# Patient Record
Sex: MALE | Race: WHITE | NOT HISPANIC OR LATINO | Employment: OTHER | URBAN - METROPOLITAN AREA
[De-identification: names, ages, dates, MRNs, and addresses within clinical notes are randomized per-mention and may not be internally consistent; named-entity substitution may affect disease eponyms.]

---

## 2023-09-25 ENCOUNTER — HOSPITAL ENCOUNTER (OUTPATIENT)
Dept: DATA CONVERSION | Facility: HOSPITAL | Age: 63
Discharge: HOME | End: 2023-09-25

## 2023-09-25 DIAGNOSIS — M19.012 PRIMARY OSTEOARTHRITIS, LEFT SHOULDER: ICD-10-CM

## 2023-09-25 DIAGNOSIS — M75.42 IMPINGEMENT SYNDROME OF LEFT SHOULDER: ICD-10-CM

## 2023-09-25 DIAGNOSIS — M75.22 BICIPITAL TENDINITIS, LEFT SHOULDER: ICD-10-CM

## 2023-09-25 DIAGNOSIS — M75.112 INCOMPLETE ROTATOR CUFF TEAR OR RUPTURE OF LEFT SHOULDER, NOT SPECIFIED AS TRAUMATIC: ICD-10-CM

## 2023-11-24 ENCOUNTER — OFFICE VISIT (OUTPATIENT)
Dept: CARDIOLOGY | Facility: CLINIC | Age: 63
End: 2023-11-24
Payer: COMMERCIAL

## 2023-11-24 VITALS
SYSTOLIC BLOOD PRESSURE: 132 MMHG | OXYGEN SATURATION: 98 % | TEMPERATURE: 98.6 F | BODY MASS INDEX: 24.11 KG/M2 | RESPIRATION RATE: 18 BRPM | WEIGHT: 178 LBS | DIASTOLIC BLOOD PRESSURE: 73 MMHG | HEART RATE: 55 BPM | HEIGHT: 72 IN

## 2023-11-24 DIAGNOSIS — E78.2 MIXED HYPERLIPIDEMIA: ICD-10-CM

## 2023-11-24 DIAGNOSIS — I10 ESSENTIAL HYPERTENSION: Primary | ICD-10-CM

## 2023-11-24 DIAGNOSIS — I20.9 ANGINA PECTORIS (CMS-HCC): ICD-10-CM

## 2023-11-24 DIAGNOSIS — I25.118 ATHEROSCLEROTIC HEART DISEASE OF NATIVE CORONARY ARTERY WITH OTHER FORMS OF ANGINA PECTORIS (CMS-HCC): ICD-10-CM

## 2023-11-24 PROBLEM — Z97.3 WEARS GLASSES: Status: ACTIVE | Noted: 2023-11-24

## 2023-11-24 PROBLEM — H53.10 DISTURBANCE, VISUAL, SUBJECTIVE: Status: ACTIVE | Noted: 2023-11-24

## 2023-11-24 PROBLEM — D31.32 CHOROIDAL NEVUS, LEFT EYE: Status: ACTIVE | Noted: 2023-11-24

## 2023-11-24 PROBLEM — M25.541 PAIN IN JOINTS OF RIGHT HAND: Status: ACTIVE | Noted: 2018-09-18

## 2023-11-24 PROBLEM — I51.9 HEART DISEASE: Status: ACTIVE | Noted: 2023-11-24

## 2023-11-24 PROBLEM — H35.439 COBBLESTONE RETINAL DEGENERATION: Status: ACTIVE | Noted: 2023-11-24

## 2023-11-24 PROBLEM — D17.1 LIPOMA OF SKIN AND SUBCUTANEOUS TISSUE OF TRUNK: Status: ACTIVE | Noted: 2023-11-24

## 2023-11-24 PROBLEM — H25.13 NUCLEAR SCLEROSIS OF BOTH EYES: Status: ACTIVE | Noted: 2023-11-24

## 2023-11-24 PROBLEM — H01.009 BLEPHARITIS: Status: ACTIVE | Noted: 2023-11-24

## 2023-11-24 PROBLEM — H43.393 VITREOUS FLOATERS OF BOTH EYES: Status: ACTIVE | Noted: 2023-11-24

## 2023-11-24 PROBLEM — J37.0 CHRONIC LARYNGITIS: Status: ACTIVE | Noted: 2019-10-31

## 2023-11-24 PROBLEM — R04.0 ANTERIOR EPISTAXIS: Status: ACTIVE | Noted: 2023-01-03

## 2023-11-24 PROBLEM — K22.2 SCHATZKI'S RING: Status: ACTIVE | Noted: 2022-05-11

## 2023-11-24 PROBLEM — J31.0 CHRONIC RHINITIS: Status: ACTIVE | Noted: 2019-10-31

## 2023-11-24 PROBLEM — H04.123 DRY EYES: Status: ACTIVE | Noted: 2023-11-24

## 2023-11-24 PROBLEM — H52.7 REFRACTIVE ERROR: Status: ACTIVE | Noted: 2023-11-24

## 2023-11-24 PROBLEM — R53.83 FATIGUE: Status: ACTIVE | Noted: 2018-08-30

## 2023-11-24 PROBLEM — R49.8 OTHER VOICE AND RESONANCE DISORDERS: Status: ACTIVE | Noted: 2019-10-31

## 2023-11-24 PROBLEM — R06.02 SHORTNESS OF BREATH: Status: ACTIVE | Noted: 2018-08-30

## 2023-11-24 PROBLEM — R13.10 DYSPHAGIA: Status: ACTIVE | Noted: 2019-10-31

## 2023-11-24 PROBLEM — K21.9 GASTROESOPHAGEAL REFLUX DISEASE WITHOUT ESOPHAGITIS: Status: ACTIVE | Noted: 2019-10-31

## 2023-11-24 PROCEDURE — 3075F SYST BP GE 130 - 139MM HG: CPT | Performed by: INTERNAL MEDICINE

## 2023-11-24 PROCEDURE — 99213 OFFICE O/P EST LOW 20 MIN: CPT | Performed by: INTERNAL MEDICINE

## 2023-11-24 PROCEDURE — 93000 ELECTROCARDIOGRAM COMPLETE: CPT | Performed by: INTERNAL MEDICINE

## 2023-11-24 PROCEDURE — 1036F TOBACCO NON-USER: CPT | Performed by: INTERNAL MEDICINE

## 2023-11-24 PROCEDURE — 3078F DIAST BP <80 MM HG: CPT | Performed by: INTERNAL MEDICINE

## 2023-11-24 RX ORDER — METOPROLOL SUCCINATE 25 MG/1
25 TABLET, EXTENDED RELEASE ORAL DAILY
COMMUNITY
Start: 2018-08-30 | End: 2023-11-24 | Stop reason: ALTCHOICE

## 2023-11-24 RX ORDER — NAPROXEN 500 MG/1
500 TABLET ORAL
COMMUNITY
End: 2023-11-24 | Stop reason: ALTCHOICE

## 2023-11-24 RX ORDER — AMOXICILLIN 500 MG/1
500 CAPSULE ORAL EVERY 8 HOURS SCHEDULED
COMMUNITY
End: 2023-11-24 | Stop reason: ALTCHOICE

## 2023-11-24 RX ORDER — TAMSULOSIN HYDROCHLORIDE 0.4 MG/1
0.4 CAPSULE ORAL NIGHTLY
COMMUNITY
Start: 2023-06-15

## 2023-11-24 RX ORDER — PREDNISONE 10 MG/1
10 TABLET ORAL DAILY
COMMUNITY
End: 2023-11-24 | Stop reason: ALTCHOICE

## 2023-11-24 RX ORDER — SIMVASTATIN 40 MG/1
40 TABLET, FILM COATED ORAL NIGHTLY
COMMUNITY
Start: 2018-08-30 | End: 2023-11-24 | Stop reason: ALTCHOICE

## 2023-11-24 RX ORDER — METHOCARBAMOL 750 MG/1
750 TABLET, FILM COATED ORAL 3 TIMES DAILY
COMMUNITY
End: 2023-11-24 | Stop reason: ALTCHOICE

## 2023-11-24 RX ORDER — VALACYCLOVIR HYDROCHLORIDE 1 G/1
1 TABLET, FILM COATED ORAL DAILY
COMMUNITY

## 2023-11-24 RX ORDER — OMEPRAZOLE 20 MG/1
20 CAPSULE, DELAYED RELEASE ORAL
COMMUNITY
Start: 2023-10-31

## 2023-11-24 RX ORDER — MECLIZINE HCL 12.5 MG 12.5 MG/1
12.5 TABLET ORAL 3 TIMES DAILY
COMMUNITY
Start: 2023-08-17 | End: 2023-11-24 | Stop reason: ALTCHOICE

## 2023-11-24 RX ORDER — OMEPRAZOLE 20 MG/1
20 TABLET, ORALLY DISINTEGRATING, DELAYED RELEASE ORAL DAILY
COMMUNITY
End: 2023-11-24 | Stop reason: ALTCHOICE

## 2023-11-24 RX ORDER — ASPIRIN 81 MG/1
81 TABLET ORAL DAILY
COMMUNITY

## 2023-11-24 RX ORDER — GLUCOSAM/CHONDRO/HERB 149/HYAL 750-100 MG
1 TABLET ORAL DAILY
COMMUNITY

## 2023-11-24 RX ORDER — ALBUTEROL SULFATE 90 UG/1
2 AEROSOL, METERED RESPIRATORY (INHALATION) DAILY PRN
COMMUNITY
Start: 2018-12-12 | End: 2023-11-24 | Stop reason: ALTCHOICE

## 2023-11-24 RX ORDER — FLUTICASONE PROPIONATE 50 MCG
2 SPRAY, SUSPENSION (ML) NASAL 2 TIMES DAILY
COMMUNITY
Start: 2023-08-17 | End: 2023-11-24 | Stop reason: ALTCHOICE

## 2023-11-24 RX ORDER — METOPROLOL TARTRATE 25 MG/1
25 TABLET, FILM COATED ORAL DAILY
COMMUNITY
Start: 2018-04-19 | End: 2023-11-24 | Stop reason: ALTCHOICE

## 2023-11-24 RX ORDER — SILDENAFIL 100 MG/1
100 TABLET, FILM COATED ORAL AS NEEDED
COMMUNITY
Start: 2023-01-10

## 2023-11-24 RX ORDER — OXYCODONE AND ACETAMINOPHEN 5; 325 MG/1; MG/1
1 TABLET ORAL EVERY 6 HOURS PRN
COMMUNITY
End: 2023-11-24 | Stop reason: ALTCHOICE

## 2023-11-24 RX ORDER — VALACYCLOVIR HYDROCHLORIDE 500 MG/1
500 TABLET, FILM COATED ORAL DAILY
COMMUNITY
Start: 2018-08-07 | End: 2023-11-24 | Stop reason: DRUGHIGH

## 2023-11-24 RX ORDER — ACETAMINOPHEN 500 MG
5000 TABLET ORAL DAILY
COMMUNITY

## 2023-11-24 RX ORDER — IBUPROFEN 800 MG/1
800 TABLET ORAL EVERY 6 HOURS PRN
COMMUNITY
End: 2023-11-24 | Stop reason: ALTCHOICE

## 2023-11-24 RX ORDER — AMOXICILLIN 875 MG/1
875 TABLET, FILM COATED ORAL 2 TIMES DAILY
COMMUNITY
Start: 2023-08-17 | End: 2023-08-27

## 2023-11-24 RX ORDER — CEPHALEXIN 500 MG/1
500 CAPSULE ORAL 3 TIMES DAILY
COMMUNITY
End: 2023-11-24 | Stop reason: ALTCHOICE

## 2023-11-24 RX ORDER — ATORVASTATIN CALCIUM 80 MG/1
80 TABLET, FILM COATED ORAL DAILY
COMMUNITY
End: 2024-03-22 | Stop reason: SDUPTHER

## 2023-11-24 RX ORDER — OMEPRAZOLE 20 MG/1
20 CAPSULE, DELAYED RELEASE ORAL
COMMUNITY
End: 2023-11-24 | Stop reason: ALTCHOICE

## 2023-11-24 RX ORDER — HYDROCODONE BITARTRATE AND ACETAMINOPHEN 5; 325 MG/1; MG/1
1 TABLET ORAL EVERY 6 HOURS PRN
COMMUNITY
End: 2023-11-24 | Stop reason: ALTCHOICE

## 2023-11-24 RX ORDER — UBIDECARENONE 30 MG
30 CAPSULE ORAL DAILY
COMMUNITY

## 2023-11-24 RX ORDER — ALBUTEROL SULFATE 90 UG/1
2 AEROSOL, METERED RESPIRATORY (INHALATION) EVERY 6 HOURS PRN
COMMUNITY

## 2023-11-24 RX ORDER — ACETAMINOPHEN, DIPHENHYDRAMINE HCL, PHENYLEPHRINE HCL 325; 25; 5 MG/1; MG/1; MG/1
1 TABLET ORAL DAILY
COMMUNITY

## 2023-11-24 RX ORDER — METHYLPREDNISOLONE 4 MG/1
4 TABLET ORAL SEE ADMIN INSTRUCTIONS
COMMUNITY
Start: 2023-10-03 | End: 2023-11-24 | Stop reason: ALTCHOICE

## 2023-11-24 RX ORDER — AMITRIPTYLINE HYDROCHLORIDE 25 MG/1
25 TABLET, FILM COATED ORAL NIGHTLY
COMMUNITY
End: 2023-11-24 | Stop reason: ALTCHOICE

## 2023-11-24 RX ORDER — OXYCODONE HYDROCHLORIDE 5 MG/1
5 TABLET ORAL EVERY 6 HOURS PRN
COMMUNITY
End: 2023-11-24 | Stop reason: ALTCHOICE

## 2023-11-24 RX ORDER — ALFUZOSIN HYDROCHLORIDE 10 MG/1
10 TABLET, EXTENDED RELEASE ORAL DAILY
COMMUNITY
End: 2023-11-24 | Stop reason: ALTCHOICE

## 2023-11-24 RX ORDER — OMEPRAZOLE 40 MG/1
40 CAPSULE, DELAYED RELEASE ORAL
COMMUNITY
Start: 2018-08-30 | End: 2023-11-24 | Stop reason: ALTCHOICE

## 2023-11-24 ASSESSMENT — LIFESTYLE VARIABLES
HOW OFTEN DURING THE LAST YEAR HAVE YOU FOUND THAT YOU WERE NOT ABLE TO STOP DRINKING ONCE YOU HAD STARTED: NEVER
HOW OFTEN DO YOU HAVE A DRINK CONTAINING ALCOHOL: 2-3 TIMES A WEEK
HOW OFTEN DURING THE LAST YEAR HAVE YOU BEEN UNABLE TO REMEMBER WHAT HAPPENED THE NIGHT BEFORE BECAUSE YOU HAD BEEN DRINKING: NEVER
HOW OFTEN DURING THE LAST YEAR HAVE YOU HAD A FEELING OF GUILT OR REMORSE AFTER DRINKING: NEVER
AUDIT TOTAL SCORE: 3
HAVE YOU OR SOMEONE ELSE BEEN INJURED AS A RESULT OF YOUR DRINKING: NO
AUDIT-C TOTAL SCORE: 3
HOW OFTEN DURING THE LAST YEAR HAVE YOU NEEDED AN ALCOHOLIC DRINK FIRST THING IN THE MORNING TO GET YOURSELF GOING AFTER A NIGHT OF HEAVY DRINKING: NEVER
HOW OFTEN DO YOU HAVE SIX OR MORE DRINKS ON ONE OCCASION: NEVER
HAS A RELATIVE, FRIEND, DOCTOR, OR ANOTHER HEALTH PROFESSIONAL EXPRESSED CONCERN ABOUT YOUR DRINKING OR SUGGESTED YOU CUT DOWN: NO
HOW MANY STANDARD DRINKS CONTAINING ALCOHOL DO YOU HAVE ON A TYPICAL DAY: 1 OR 2
HOW OFTEN DURING THE LAST YEAR HAVE YOU FAILED TO DO WHAT WAS NORMALLY EXPECTED FROM YOU BECAUSE OF DRINKING: NEVER
SKIP TO QUESTIONS 9-10: 1

## 2023-11-24 ASSESSMENT — PATIENT HEALTH QUESTIONNAIRE - PHQ9
1. LITTLE INTEREST OR PLEASURE IN DOING THINGS: NOT AT ALL
2. FEELING DOWN, DEPRESSED OR HOPELESS: NOT AT ALL
SUM OF ALL RESPONSES TO PHQ9 QUESTIONS 1 AND 2: 0

## 2023-11-24 ASSESSMENT — PAIN SCALES - GENERAL: PAINLEVEL: 0-NO PAIN

## 2023-11-24 NOTE — PROGRESS NOTES
Subjective   Chief Complaint   Patient presents with    Follow-up     Patient presents to the office for a 6 month follow up visit.         62-year-old patient with a multiple cardiac risk factors.  History of hypertension hyperlipidemia history of asthma.  History of CAD, history of hernia surgery in the past with a shoulder surgery in past  Patient had a PCI of her LAD in January 2005.  Patient had diagnostic catheterization November 2021 essentially with small vessel disease with a patent stents in LAD.  Status post left-sided shoulder surgery.  No active angina CHF signs symptoms  Patient had a lipid profile done in July 2023 had a LDL 66, HDL 41 And triglyceride 80.  Patient stable cardiac with no active angina CHF signs symptoms.    Past Medical History:   Diagnosis Date    Angina pectoris (CMS/[facility]) 11/24/2023    Atherosclerotic heart disease of native coronary artery with other forms of angina pectoris (CMS/[facility]) 11/24/2023    Essential hypertension 11/24/2023    Heart disease 11/24/2023    Mixed hyperlipidemia 11/24/2023    Unspecified blepharitis right eye, unspecified eyelid 10/08/2019    Blepharitis of both eyes     Past Surgical History:   Procedure Laterality Date    HERNIA REPAIR      MR SHOULDER ARTHROGRAM LEFT W FL GUIDED INJECTION Left 04/03/2015    MR SHOULDER ARTHROGRAM LEFT W FL GUIDED INJECTION [facility]    ROTATOR CUFF REPAIR Bilateral      No relevant family history has been documented for this patient.  Current Outpatient Medications   Medication Sig Dispense Refill    albuterol 90 mcg/actuation inhaler Inhale 2 puffs every 6 hours if needed for shortness of breath or wheezing.      aspirin (Adult Low Dose Aspirin) 81 mg EC tablet Take 1 tablet (81 mg) by mouth once daily.      atorvastatin (Lipitor) 80 mg tablet Take 1 tablet (80 mg) by mouth once daily.      cholecalciferol (Vitamin D-3) 5,000 Units tablet Take 1 tablet (5,000 Units) by mouth once daily.      co-enzyme Q-10 30 mg  capsule Take 1 capsule (30 mg) by mouth once daily.      cyanocobalamin, vitamin B-12, (Vitamin B-12) 5,000 mcg tablet, sublingual Place 1 tablet under the tongue once daily.      omega 3-dha-epa-fish oil (Fish OiL) 1,000 mg (120 mg-180 mg) capsule Take 1 capsule (1,000 mg) by mouth once daily.      omeprazole (PriLOSEC) 20 mg DR capsule Take 1 capsule (20 mg) by mouth once daily in the morning. Take before meals.      sildenafil (Viagra) 100 mg tablet Take 1 tablet (100 mg) by mouth if needed.      tamsulosin (Flomax) 0.4 mg 24 hr capsule Take 1 capsule (0.4 mg) by mouth once daily at bedtime.      valACYclovir (Valtrex) 1 gram tablet Take 1 tablet (1,000 mg) by mouth once daily.      vitamin B complex vit C no.4 (SUPER B COMPLEX + C ORAL) Take 1 tablet by mouth once daily.      ZINC ORAL Take 20 mg by mouth once daily.       No current facility-administered medications for this visit.      reports that he has never smoked. He has never been exposed to tobacco smoke. He has never used smokeless tobacco. He reports current alcohol use. He reports that he does not use drugs.  Patient has no known allergies.  Essential hypertension    Vitals:    11/24/23 1011   BP: 132/73   Pulse: 55   Resp: 18   Temp: 37 °C (98.6 °F)   SpO2: 98%   Weight: 80.7 kg (178 lb)   Height: 1.829 m (6')   PainSc: 0-No pain      BMI:Body mass index is 24.14 kg/m².   General Cardiology:  General Appearance: Alert, oriented and in no acute distress.  HEENT: extra ocular movements intact (EOMI), pupils equal,  round, reactive to light and accommodation (PERRLA).  Carotid Upstroke: no bruit, normal.  Jugular Venous Distention (JVD): flat.  Chest: normal.  Lungs: Clear to auscultation,   Heart Sounds: no S3 or S4, normal S1, S2, regular rate.  Murmur, Click, Gallop: no systolic murmur.  Abdomen: no hepatomegaly, no masses felt, soft.  Extremities: no leg edema.  Peripheral pulses: 2 plus bilateral.  NEUROLOGY Cranial nerves II-XII grossly intact.      Patient Active Problem List   Diagnosis    Angina pectoris (CMS/HCC)    Anterior epistaxis    Coronary atherosclerosis    Blepharitis    Choroidal nevus, left eye    Chronic laryngitis    Cobblestone retinal degeneration    Disturbance, visual, subjective    Dry eyes    Dysphagia    Essential hypertension    Fatigue    GERD (gastroesophageal reflux disease)    Heart disease    Mixed hyperlipidemia    Lipoma of skin and subcutaneous tissue of trunk    Nuclear sclerosis of both eyes    Other voice and resonance disorders    Pain in joints of right hand    Refractive error    Shortness of breath    Vitreous floaters of both eyes    Wears glasses    Schatzki's ring       Problem List Items Addressed This Visit          Cardiac and Vasculature    Angina pectoris (CMS/HCC)    Relevant Medications    sildenafil (Viagra) 100 mg tablet    Coronary atherosclerosis    Relevant Medications    sildenafil (Viagra) 100 mg tablet    Essential hypertension - Primary    Relevant Orders    ECG 12 Lead    Follow Up In Cardiology    Mixed hyperlipidemia    Relevant Orders    ECG 12 Lead    Follow Up In Cardiology      As above  History of CAD, hypertension hyperlipidemia currently on a beta-blocker as well as aspirin.  Lipid profile is current and within normal range.  Modify risk factor.  Continue current treatment.  No further change in plans.  Advised patient to avoid lunch meats, canned soups, pizzas, bread rolls, and sandwiches. Advised patient to limit salt intake 1,500 mg daily. Advised patient to exercise 30 mins/3 times a week including treadmill or aerobic type, Goal to achieve 65% target HR.  Diet and exercise reviewed with patient..advice to walk about 10,000 steps or about 2 hours during day time. Cut back on salt, sugar and flour.  Advised patient to check blood pressure twice a day for next 10 days and give us a call if her blood pressure remains above 170 systolic and diastolic above 95.  Meanwhile cut back on salt,  caffeine.  If blood pressure persistently stays above 200 systolic then go to nearest emergency room or call 911.    Roland Figueroa MD

## 2024-03-22 DIAGNOSIS — E78.2 MIXED HYPERLIPIDEMIA: ICD-10-CM

## 2024-03-22 NOTE — TELEPHONE ENCOUNTER
Pt is requesting a refill       Requested Prescriptions     Pending Prescriptions Disp Refills    atorvastatin (Lipitor) 80 mg tablet 90 tablet 3     Sig: Take 1 tablet (80 mg) by mouth once daily.

## 2024-03-25 RX ORDER — ATORVASTATIN CALCIUM 80 MG/1
80 TABLET, FILM COATED ORAL DAILY
Qty: 90 TABLET | Refills: 3 | Status: SHIPPED | OUTPATIENT
Start: 2024-03-25 | End: 2025-03-20

## 2024-05-23 RX ORDER — FLUTICASONE PROPIONATE 50 MCG
2 SPRAY, SUSPENSION (ML) NASAL 2 TIMES DAILY
COMMUNITY
Start: 2024-02-07

## 2024-05-24 ENCOUNTER — OFFICE VISIT (OUTPATIENT)
Dept: CARDIOLOGY | Facility: CLINIC | Age: 64
End: 2024-05-24
Payer: COMMERCIAL

## 2024-05-24 VITALS
WEIGHT: 183 LBS | SYSTOLIC BLOOD PRESSURE: 101 MMHG | OXYGEN SATURATION: 98 % | HEART RATE: 58 BPM | RESPIRATION RATE: 18 BRPM | TEMPERATURE: 98.9 F | BODY MASS INDEX: 24.79 KG/M2 | HEIGHT: 72 IN | DIASTOLIC BLOOD PRESSURE: 57 MMHG

## 2024-05-24 DIAGNOSIS — I25.118 ATHEROSCLEROSIS OF CORONARY ARTERY OF NATIVE HEART WITH STABLE ANGINA PECTORIS, UNSPECIFIED VESSEL OR LESION TYPE (CMS-HCC): Primary | ICD-10-CM

## 2024-05-24 DIAGNOSIS — E78.2 MIXED HYPERLIPIDEMIA: ICD-10-CM

## 2024-05-24 DIAGNOSIS — I10 ESSENTIAL HYPERTENSION: ICD-10-CM

## 2024-05-24 DIAGNOSIS — K21.9 GASTROESOPHAGEAL REFLUX DISEASE WITHOUT ESOPHAGITIS: ICD-10-CM

## 2024-05-24 PROCEDURE — 99213 OFFICE O/P EST LOW 20 MIN: CPT | Performed by: INTERNAL MEDICINE

## 2024-05-24 PROCEDURE — 3074F SYST BP LT 130 MM HG: CPT | Performed by: INTERNAL MEDICINE

## 2024-05-24 PROCEDURE — 3078F DIAST BP <80 MM HG: CPT | Performed by: INTERNAL MEDICINE

## 2024-05-24 PROCEDURE — 1036F TOBACCO NON-USER: CPT | Performed by: INTERNAL MEDICINE

## 2024-05-24 ASSESSMENT — LIFESTYLE VARIABLES
HOW OFTEN DO YOU HAVE SIX OR MORE DRINKS ON ONE OCCASION: NEVER
HOW MANY STANDARD DRINKS CONTAINING ALCOHOL DO YOU HAVE ON A TYPICAL DAY: 1 OR 2
HAS A RELATIVE, FRIEND, DOCTOR, OR ANOTHER HEALTH PROFESSIONAL EXPRESSED CONCERN ABOUT YOUR DRINKING OR SUGGESTED YOU CUT DOWN: NO
HAVE YOU OR SOMEONE ELSE BEEN INJURED AS A RESULT OF YOUR DRINKING: NO
AUDIT-C TOTAL SCORE: 2
HOW OFTEN DO YOU HAVE A DRINK CONTAINING ALCOHOL: 2-4 TIMES A MONTH
SKIP TO QUESTIONS 9-10: 1
AUDIT TOTAL SCORE: 2

## 2024-05-24 ASSESSMENT — PATIENT HEALTH QUESTIONNAIRE - PHQ9
2. FEELING DOWN, DEPRESSED OR HOPELESS: NOT AT ALL
SUM OF ALL RESPONSES TO PHQ9 QUESTIONS 1 AND 2: 0
1. LITTLE INTEREST OR PLEASURE IN DOING THINGS: NOT AT ALL

## 2024-05-24 ASSESSMENT — ENCOUNTER SYMPTOMS
DEPRESSION: 0
OCCASIONAL FEELINGS OF UNSTEADINESS: 0
LOSS OF SENSATION IN FEET: 0

## 2024-05-24 ASSESSMENT — PAIN SCALES - GENERAL: PAINLEVEL: 0-NO PAIN

## 2024-05-24 NOTE — PROGRESS NOTES
Subjective   Chief Complaint   Patient presents with    Follow-up     Mr. Griffith is present for his 6 month Follow up with Dr. Figueroa         63-year-old patient with a history of hypertension hyperlipidemia currently on aspirin, Lipitor 80 mg, PPI.  No active chest pain tightness also currently taking fish oil.  Fairly active.  Patient was last seen in November last year essentially history of CAD, hernia surgery in the past with a shoulder surgery in past.  PCI of LAD done in 2005.  Patient diagnostic catheterization November 2021 essentially small vessel disease with a patent stent in LAD otherwise okay.  Patient had a TSH was within normal range comprehensive metabolic panel was done about 3 months ago essentially unremarkable.  CBC was also unremarkable.  Lipid profile was done about 4 months ago total cholesterol 115 triglyceride 59 and LDL was 61 mg deciliter.  Patient currently active.    Past Medical History:   Diagnosis Date    Angina pectoris (CMS-HCC) 11/24/2023    Atherosclerotic heart disease of native coronary artery with other forms of angina pectoris (CMS-HCC) 11/24/2023    Coronary atherosclerosis 02/27/2007    Formatting of this note might be different from the original. MI  Date: 1/05 PCI  Date: 1/05 - stent to his LAD Last Assessment & Plan: Formatting of this note might be different from the original. Assessment: States is well controlled with medication and denies any recent exacerbations Follows with PCP and cardiology History of MI with single stent placed in 2005 Treated with ASA 81 mg Denies any    Essential hypertension 11/24/2023    Heart disease 11/24/2023    Mixed hyperlipidemia 11/24/2023    Unspecified blepharitis right eye, unspecified eyelid 10/08/2019    Blepharitis of both eyes     Past Surgical History:   Procedure Laterality Date    HERNIA REPAIR      MR ATHROGRAM SHOULDER LEFT W FL GUIDED INJECTION Left 04/03/2015    MR SHOULDER ARTHROGRAM LEFT W FL GUIDED INJECTION [facility]    ROTATOR CUFF REPAIR Bilateral      No relevant family history has been documented for this patient.  Current Outpatient Medications   Medication Sig Dispense Refill    albuterol 90 mcg/actuation inhaler Inhale 2 puffs every 6 hours if needed for shortness of breath or wheezing.      aspirin (Adult Low Dose Aspirin) 81 mg EC tablet Take 1 tablet (81 mg) by mouth once daily.      atorvastatin (Lipitor) 80 mg tablet Take 1 tablet (80 mg) by mouth once daily. 90 tablet 3    cholecalciferol (Vitamin D-3) 5,000 Units tablet Take 1 tablet (5,000 Units) by mouth once daily.      co-enzyme Q-10 30 mg capsule Take 1 capsule (30 mg) by mouth once daily.      cyanocobalamin, vitamin B-12, (Vitamin B-12) 5,000 mcg tablet, sublingual Place 1 tablet under the tongue once daily.      fluticasone (Flonase) 50 mcg/actuation nasal spray Administer 2 sprays into each nostril 2 times a day.      omega 3-dha-epa-fish oil (Fish OiL) 1,000 mg (120 mg-180 mg) capsule Take 1 capsule (1,000 mg) by mouth once daily.      omeprazole (PriLOSEC) 20 mg DR capsule Take 1 capsule (20 mg) by mouth once daily in the morning. Take before meals.      sildenafil (Viagra) 100 mg tablet Take 1 tablet (100 mg) by mouth if needed.      tamsulosin (Flomax) 0.4 mg 24 hr capsule Take 1 capsule (0.4 mg) by mouth once daily at bedtime.      valACYclovir (Valtrex) 1 gram tablet Take 1 tablet (1,000 mg) by mouth once daily.      vitamin B complex vit C no.4 (SUPER B COMPLEX + C ORAL) Take 1 tablet by mouth once daily.      ZINC ORAL Take 20 mg by mouth once daily.       No current facility-administered medications for this visit.      reports that he has never smoked. He has never been exposed to tobacco smoke. He has never used smokeless tobacco. He reports current alcohol use of about 2.0 standard drinks of alcohol per week. He reports that he does not use drugs.  Patient has no known allergies.  Essential hypertension  Mixed  hyperlipidemia    Vitals:    05/24/24 1019   BP: 101/57   Pulse: 58   Resp: 18   Temp: 37.2 °C (98.9 °F)   TempSrc: Core   SpO2: 98%   Weight: 83 kg (183 lb)   Height: 1.829 m (6')   PainSc: 0-No pain      BMI:Body mass index is 24.82 kg/m².   General Cardiology:  General Appearance: Alert, oriented and in no acute distress.  HEENT: extra ocular movements intact (EOMI), pupils equal,  round, reactive to light and accommodation (PERRLA).  Carotid Upstroke: no bruit, normal.  Jugular Venous Distention (JVD): flat.  Chest: normal.  Lungs: Clear to auscultation,   Heart Sounds: no S3 or S4, normal S1, S2, regular rate.  Murmur, Click, Gallop: no systolic murmur.  Abdomen: no hepatomegaly, no masses felt, soft.  Extremities: no leg edema.  Peripheral pulses: 2 plus bilateral.  NEUROLOGY Cranial nerves II-XII grossly intact.     Patient Active Problem List   Diagnosis    Angina pectoris (CMS-HCC)    Anterior epistaxis    Coronary atherosclerosis    Blepharitis    Choroidal nevus, left eye    Chronic laryngitis    Cobblestone retinal degeneration    Disturbance, visual, subjective    Dry eyes    Dysphagia    Essential hypertension    Fatigue    GERD (gastroesophageal reflux disease)    Heart disease    Mixed hyperlipidemia    Lipoma of skin and subcutaneous tissue of trunk    Nuclear sclerosis of both eyes    Other voice and resonance disorders    Pain in joints of right hand    Refractive error    Shortness of breath    Vitreous floaters of both eyes    Wears glasses    Schatzki's ring       Problem List Items Addressed This Visit       Coronary atherosclerosis - Primary    Essential hypertension    GERD (gastroesophageal reflux disease)    Mixed hyperlipidemia      63-year-old patient with history of CAD, hypertension, hyperlipidemia currently on aspirin as well as cholesterol medicine.  Currently running low blood pressure could be contributing to her tiredness and fatigue.  1.  CAD: Continue current aspirin and statin  therapy.  Modify risk factor.  May take lipid treatment half the dose of Lipitor. Diet and exercise reviewed with patient..advice to walk about 10,000 steps or about 2 hours during day time. Cut back on salt, sugar and flour.    Pt. care time is spent includes review of diagnostic tests, labs, radiographs, EKGs, old echoes, cardiac work-up and coordination of care. Assessment, impression and plans are reflected in the note above as well as the orders.    Roland Figueroa MD

## 2024-11-13 ENCOUNTER — PREP FOR PROCEDURE (OUTPATIENT)
Dept: ORTHOPEDIC SURGERY | Facility: HOSPITAL | Age: 64
End: 2024-11-13
Payer: COMMERCIAL

## 2024-11-13 ENCOUNTER — TELEPHONE (OUTPATIENT)
Dept: CARDIOLOGY | Facility: CLINIC | Age: 64
End: 2024-11-13
Payer: COMMERCIAL

## 2024-11-13 DIAGNOSIS — M75.122 NONTRAUMATIC COMPLETE TEAR OF LEFT ROTATOR CUFF: Primary | ICD-10-CM

## 2024-11-27 PROBLEM — H69.91 EUSTACHIAN TUBE DYSFUNCTION, RIGHT: Status: ACTIVE | Noted: 2024-02-07

## 2024-11-27 PROBLEM — S00.411A ABRASION OF RIGHT EAR CANAL: Status: ACTIVE | Noted: 2024-02-07

## 2024-12-02 ENCOUNTER — APPOINTMENT (OUTPATIENT)
Dept: CARDIOLOGY | Facility: CLINIC | Age: 64
End: 2024-12-02
Payer: COMMERCIAL

## 2024-12-02 ENCOUNTER — TELEPHONE (OUTPATIENT)
Dept: CARDIOLOGY | Facility: CLINIC | Age: 64
End: 2024-12-02

## 2024-12-02 NOTE — TELEPHONE ENCOUNTER
Contacted patient to inform them that their appointment for today needed to be rescheduled due to inclement weather. I spoke with the patient directly and we successfully rescheduled the appointment to 12/5/2024 at 10:15 am.

## 2024-12-05 ENCOUNTER — OFFICE VISIT (OUTPATIENT)
Dept: CARDIOLOGY | Facility: CLINIC | Age: 64
End: 2024-12-05
Payer: COMMERCIAL

## 2024-12-05 VITALS
WEIGHT: 189 LBS | SYSTOLIC BLOOD PRESSURE: 139 MMHG | OXYGEN SATURATION: 98 % | HEIGHT: 72 IN | TEMPERATURE: 98.6 F | RESPIRATION RATE: 16 BRPM | HEART RATE: 62 BPM | DIASTOLIC BLOOD PRESSURE: 70 MMHG | BODY MASS INDEX: 25.6 KG/M2

## 2024-12-05 DIAGNOSIS — I25.118 ATHEROSCLEROSIS OF CORONARY ARTERY OF NATIVE HEART WITH STABLE ANGINA PECTORIS, UNSPECIFIED VESSEL OR LESION TYPE: Primary | ICD-10-CM

## 2024-12-05 DIAGNOSIS — I10 ESSENTIAL HYPERTENSION: ICD-10-CM

## 2024-12-05 DIAGNOSIS — E78.2 MIXED HYPERLIPIDEMIA: ICD-10-CM

## 2024-12-05 PROCEDURE — 3078F DIAST BP <80 MM HG: CPT | Performed by: INTERNAL MEDICINE

## 2024-12-05 PROCEDURE — 3075F SYST BP GE 130 - 139MM HG: CPT | Performed by: INTERNAL MEDICINE

## 2024-12-05 PROCEDURE — 99214 OFFICE O/P EST MOD 30 MIN: CPT | Performed by: INTERNAL MEDICINE

## 2024-12-05 PROCEDURE — G2211 COMPLEX E/M VISIT ADD ON: HCPCS | Performed by: INTERNAL MEDICINE

## 2024-12-05 PROCEDURE — 1036F TOBACCO NON-USER: CPT | Performed by: INTERNAL MEDICINE

## 2024-12-05 PROCEDURE — 3008F BODY MASS INDEX DOCD: CPT | Performed by: INTERNAL MEDICINE

## 2024-12-05 RX ORDER — METOPROLOL SUCCINATE 25 MG/1
25 TABLET, EXTENDED RELEASE ORAL DAILY
Qty: 90 TABLET | Refills: 3 | Status: SHIPPED | OUTPATIENT
Start: 2024-12-05 | End: 2025-12-05

## 2024-12-05 ASSESSMENT — ENCOUNTER SYMPTOMS
LOSS OF SENSATION IN FEET: 0
OCCASIONAL FEELINGS OF UNSTEADINESS: 0
DEPRESSION: 0

## 2024-12-05 ASSESSMENT — LIFESTYLE VARIABLES
AUDIT TOTAL SCORE: 1
HOW MANY STANDARD DRINKS CONTAINING ALCOHOL DO YOU HAVE ON A TYPICAL DAY: 1 OR 2
HOW OFTEN DO YOU HAVE SIX OR MORE DRINKS ON ONE OCCASION: NEVER
HOW OFTEN DO YOU HAVE A DRINK CONTAINING ALCOHOL: MONTHLY OR LESS
AUDIT-C TOTAL SCORE: 1
HAS A RELATIVE, FRIEND, DOCTOR, OR ANOTHER HEALTH PROFESSIONAL EXPRESSED CONCERN ABOUT YOUR DRINKING OR SUGGESTED YOU CUT DOWN: NO
HAVE YOU OR SOMEONE ELSE BEEN INJURED AS A RESULT OF YOUR DRINKING: NO
SKIP TO QUESTIONS 9-10: 1

## 2024-12-05 ASSESSMENT — PAIN SCALES - GENERAL: PAINLEVEL_OUTOF10: 0-NO PAIN

## 2024-12-05 ASSESSMENT — PATIENT HEALTH QUESTIONNAIRE - PHQ9
1. LITTLE INTEREST OR PLEASURE IN DOING THINGS: NOT AT ALL
SUM OF ALL RESPONSES TO PHQ9 QUESTIONS 1 AND 2: 0
2. FEELING DOWN, DEPRESSED OR HOPELESS: NOT AT ALL

## 2024-12-05 NOTE — LETTER
December 5, 2024     Christiano Osorio DO  [address] [facility] [address]    Patient: Luis Griffith   YOB: 1960   Date of Visit: 12/5/2024       Dear Dr. Christiano Osorio, DO:    Thank you for referring Luis Griffith to me for evaluation. Below are my notes for this consultation.  If you have questions, please do not hesitate to call me. I look forward to following your patient along with you.       Sincerely,     Roland Figueroa MD      CC: Saji Smith V, DO  ______________________________________________________________________________________    Subjective  Chief Complaint   Patient presents with   • Follow-up     Mr. Griffith is present for his 6 month Follow up with Dr. Figueroa, pt will also need SX clearance      63-year-old patient with a history of hypertension hyperlipidemia history of  CAD, PCI of LAD done 2005.  Repeat diagnostic catheterization about 3 years ago essentially small vessel disease with a patent stents in LAD.  Patient has upcoming left shoulder surgery.  No active chest pain tightness.  So far stable cardiac perspective.  Currently only taking statin therapy.  History of hernia surgery in the past.  Also currently on an aspirin.  Asymptomatic at the moment.  Lipid profile done about 3 months ago showed total cholesterol 112 LDL is 59.  Comprehensive metabolic panel done about 3 months ago essentially unremarkable.  LFTs unremarkable.  TSH within normal range.  CBC also was normal about a 10 months ago.  Hemoglobin A1c was last year was 5.1%.  Past Medical History:   Diagnosis Date   • Angina pectoris 11/24/2023   • Atherosclerotic heart disease of native coronary artery with other forms of angina pectoris 11/24/2023   • Coronary atherosclerosis 02/27/2007    Formatting of this note might be different from the original. MI  Date: 1/05 PCI  Date: 1/05 - stent to his LAD Last Assessment & Plan: Formatting of this note might be  different from the original. Assessment: States is well controlled with medication and denies any recent exacerbations Follows with PCP and cardiology History of MI with single stent placed in 2005 Treated with ASA 81 mg Denies any   • Essential hypertension 11/24/2023   • Heart disease 11/24/2023   • Mixed hyperlipidemia 11/24/2023   • Unspecified blepharitis right eye, unspecified eyelid 10/08/2019    Blepharitis of both eyes     Past Surgical History:   Procedure Laterality Date   • HERNIA REPAIR     • MR ATHROGRAM SHOULDER LEFT W FL GUIDED INJECTION Left 04/03/2015    MR SHOULDER ARTHROGRAM LEFT W FL GUIDED INJECTION [facility]   • ROTATOR CUFF REPAIR Bilateral      No relevant family history has been documented for this patient.  Current Outpatient Medications   Medication Sig Dispense Refill   • albuterol 90 mcg/actuation inhaler Inhale 2 puffs every 6 hours if needed for shortness of breath or wheezing.     • aspirin (Adult Low Dose Aspirin) 81 mg EC tablet Take 1 tablet (81 mg) by mouth once daily.     • atorvastatin (Lipitor) 80 mg tablet Take 1 tablet (80 mg) by mouth once daily. 90 tablet 3   • cholecalciferol (Vitamin D-3) 5,000 Units tablet Take 1 tablet (5,000 Units) by mouth once daily.     • co-enzyme Q-10 30 mg capsule Take 1 capsule (30 mg) by mouth once daily.     • cyanocobalamin, vitamin B-12, (Vitamin B-12) 5,000 mcg tablet, sublingual Place 1 tablet under the tongue once daily.     • fluticasone (Flonase) 50 mcg/actuation nasal spray Administer 2 sprays into each nostril 2 times a day.     • omega 3-dha-epa-fish oil (Fish OiL) 1,000 mg (120 mg-180 mg) capsule Take 1 capsule (1,000 mg) by mouth once daily.     • omeprazole (PriLOSEC) 20 mg DR capsule Take 1 capsule (20 mg) by mouth once daily in the morning. Take before meals.     • sildenafil (Viagra) 100 mg tablet Take 1 tablet (100 mg) by mouth if needed.     • tamsulosin (Flomax) 0.4 mg 24 hr capsule Take 1 capsule (0.4 mg) by  mouth once daily at bedtime.     • valACYclovir (Valtrex) 1 gram tablet Take 1 tablet (1,000 mg) by mouth once daily.     • vitamin B complex vit C no.4 (SUPER B COMPLEX + C ORAL) Take 1 tablet by mouth once daily.     • ZINC ORAL Take 20 mg by mouth once daily.       No current facility-administered medications for this visit.      reports that he has never smoked. He has never been exposed to tobacco smoke. He has never used smokeless tobacco. He reports current alcohol use of about 2.0 standard drinks of alcohol per week. He reports that he does not use drugs.  Allergies:  Patient has no known allergies.    ROS: See HPI  CONSTITUTIONAL: Chills- none. Fever- none. Weight change appropriate for age.  HEENT: Headache- Negative.  Change in vision- none.  Ear pain- none. Nasal congestion- none. Post-nasal drip-none.  Sore throat-none.  CARDIOLOGY: Chest pain- none.  Leg edema-trace.  Murmurs-soft systolic.  Palpitation- none.  RESPIRATORY: Denies any shortness of breath.  GI: Abdominal pain- none.  Change in bowel habits- none.  Constipation- none.  Diarrhea- none.  Nausea- none.  Vomiting- none.  MUSCULOSKELETAL: Joint pain- none.  Muscle aches- none.  DERMATOLOGY: Rash- none.  NEUROLOGY: Dizziness- none.   Headache- none.  PSYCHIATRY: Denies any depression or anxiety     Vitals:    12/05/24 1008   BP: 139/70   Pulse: 62   Resp: 16   Temp: 37 °C (98.6 °F)   TempSrc: Core   SpO2: 98%   Weight: 85.7 kg (189 lb)   Height: 1.829 m (6')   PainSc: 0-No pain      BMI:Body mass index is 25.63 kg/m².   General Cardiology:  General Appearance: Alert, oriented and in no acute distress.  HEENT: extra ocular movements intact (EOMI), pupils equal,  round, reactive to light and accommodation (PERRLA).  Carotid Upstroke: no bruit, normal.  Jugular Venous Distention (JVD): flat.  Chest: normal.  Lungs: Clear to auscultation,   Heart Sounds: no S3 or S4, normal S1, S2, regular rate.  Murmur, Click, Gallop: soft systolic  murmur.  Abdomen: no hepatomegaly, no masses felt, soft.  Extremities: no leg edema.  Peripheral pulses: 2 plus bilateral.  NEUROLOGY Cranial nerves II-XII grossly intact.     Patient Active Problem List   Diagnosis   • Angina pectoris   • Anterior epistaxis   • Coronary atherosclerosis   • Blepharitis   • Choroidal nevus, left eye   • Chronic laryngitis   • Cobblestone retinal degeneration   • Disturbance, visual, subjective   • Dry eyes   • Dysphagia   • Essential hypertension   • Fatigue   • GERD (gastroesophageal reflux disease)   • Heart disease   • Mixed hyperlipidemia   • Lipoma of skin and subcutaneous tissue of trunk   • Nuclear sclerosis of both eyes   • Other voice and resonance disorders   • Pain in joints of right hand   • Refractive error   • Shortness of breath   • Vitreous floaters of both eyes   • Wears glasses   • Schatzki's ring   • Abrasion of right ear canal   • Eustachian tube dysfunction, right       Problem List Items Addressed This Visit       Coronary atherosclerosis - Primary    Essential hypertension    Mixed hyperlipidemia      63-year-old patient with history of CAD, PCI of LAD in the past.  Patient was on blood pressure meds which has been stopped due to normalizing numbers.  Patient currently on a statin therapy.  No active chest pain tightness.    1.  Hyperlipidemia: Under control with medication.  Patient currently on Lipitor 80 mg tablet once a day.  2.  CAD: Continue current aspirin 81 mg once a day.  Patient may benefit from low-dose of beta-blocker.  3.  Preop clearance for left shoulder surgery: Patient stable cardiac wise, continue beta-blocker or rate control medication.  Moderate risk for CV events during the surgery.  No contraindication for surgery.  Hold blood thinner 48 hours before surgery.    Advised patient to avoid lunch meats, canned soups, pizzas, bread rolls, and sandwiches. Advised patient to limit salt intake 1,500 mg daily. Advised patient to exercise 30 mins/3  times a week including treadmill or aerobic type, Goal to achieve 65% target HR.    Diet and exercise reviewed with patient..advice to walk about 10,000 steps or about 2 hours during day time. Cut back on salt, sugar and flour.    Pt. care time is spent includes review of diagnostic tests, labs, radiographs, EKGs, old echoes, cardiac work-up and coordination of care. Assessment, impression and plans are reflected in the note above as well as the orders.    Roland Figueroa MD

## 2024-12-05 NOTE — PROGRESS NOTES
Subjective   Chief Complaint   Patient presents with    Follow-up     Mr. Griffith is present for his 6 month Follow up with Dr. Figueroa, pt will also need SX clearance      63-year-old patient with a history of hypertension hyperlipidemia history of  CAD, PCI of LAD done 2005.  Repeat diagnostic catheterization about 3 years ago essentially small vessel disease with a patent stents in LAD.  Patient has upcoming left shoulder surgery.  No active chest pain tightness.  So far stable cardiac perspective.  Currently only taking statin therapy.  History of hernia surgery in the past.  Also currently on an aspirin.  Asymptomatic at the moment.  Lipid profile done about 3 months ago showed total cholesterol 112 LDL is 59.  Comprehensive metabolic panel done about 3 months ago essentially unremarkable.  LFTs unremarkable.  TSH within normal range.  CBC also was normal about a 10 months ago.  Hemoglobin A1c was last year was 5.1%.  Past Medical History:   Diagnosis Date    Angina pectoris 11/24/2023    Atherosclerotic heart disease of native coronary artery with other forms of angina pectoris 11/24/2023    Coronary atherosclerosis 02/27/2007    Formatting of this note might be different from the original. MI  Date: 1/05 PCI  Date: 1/05 - stent to his LAD Last Assessment & Plan: Formatting of this note might be different from the original. Assessment: States is well controlled with medication and denies any recent exacerbations Follows with PCP and cardiology History of MI with single stent placed in 2005 Treated with ASA 81 mg Denies any    Essential hypertension 11/24/2023    Heart disease 11/24/2023    Mixed hyperlipidemia 11/24/2023    Unspecified blepharitis right eye, unspecified eyelid 10/08/2019    Blepharitis of both eyes     Past Surgical History:   Procedure Laterality Date    HERNIA REPAIR      MR ATHROGRAM SHOULDER LEFT W FL GUIDED INJECTION Left 04/03/2015    MR SHOULDER ARTHROGRAM LEFT W FL GUIDED INJECTION [facility]    ROTATOR CUFF REPAIR Bilateral      No relevant family history has been documented for this patient.  Current Outpatient Medications   Medication Sig Dispense Refill    albuterol 90 mcg/actuation inhaler Inhale 2 puffs every 6 hours if needed for shortness of breath or wheezing.      aspirin (Adult Low Dose Aspirin) 81 mg EC tablet Take 1 tablet (81 mg) by mouth once daily.      atorvastatin (Lipitor) 80 mg tablet Take 1 tablet (80 mg) by mouth once daily. 90 tablet 3    cholecalciferol (Vitamin D-3) 5,000 Units tablet Take 1 tablet (5,000 Units) by mouth once daily.      co-enzyme Q-10 30 mg capsule Take 1 capsule (30 mg) by mouth once daily.      cyanocobalamin, vitamin B-12, (Vitamin B-12) 5,000 mcg tablet, sublingual Place 1 tablet under the tongue once daily.      fluticasone (Flonase) 50 mcg/actuation nasal spray Administer 2 sprays into each nostril 2 times a day.      omega 3-dha-epa-fish oil (Fish OiL) 1,000 mg (120 mg-180 mg) capsule Take 1 capsule (1,000 mg) by mouth once daily.      omeprazole (PriLOSEC) 20 mg DR capsule Take 1 capsule (20 mg) by mouth once daily in the morning. Take before meals.      sildenafil (Viagra) 100 mg tablet Take 1 tablet (100 mg) by mouth if needed.      tamsulosin (Flomax) 0.4 mg 24 hr capsule Take 1 capsule (0.4 mg) by mouth once daily at bedtime.      valACYclovir (Valtrex) 1 gram tablet Take 1 tablet (1,000 mg) by mouth once daily.      vitamin B complex vit C no.4 (SUPER B COMPLEX + C ORAL) Take 1 tablet by mouth once daily.      ZINC ORAL Take 20 mg by mouth once daily.       No current facility-administered medications for this visit.      reports that he has never smoked. He has never been exposed to tobacco smoke. He has never used smokeless tobacco. He reports current alcohol use of about 2.0 standard drinks of alcohol per week. He reports that he does not use drugs.  Allergies:  Patient has no known allergies.    ROS: See HPI  CONSTITUTIONAL:  Chills- none. Fever- none. Weight change appropriate for age.  HEENT: Headache- Negative.  Change in vision- none.  Ear pain- none. Nasal congestion- none. Post-nasal drip-none.  Sore throat-none.  CARDIOLOGY: Chest pain- none.  Leg edema-trace.  Murmurs-soft systolic.  Palpitation- none.  RESPIRATORY: Denies any shortness of breath.  GI: Abdominal pain- none.  Change in bowel habits- none.  Constipation- none.  Diarrhea- none.  Nausea- none.  Vomiting- none.  MUSCULOSKELETAL: Joint pain- none.  Muscle aches- none.  DERMATOLOGY: Rash- none.  NEUROLOGY: Dizziness- none.   Headache- none.  PSYCHIATRY: Denies any depression or anxiety     Vitals:    12/05/24 1008   BP: 139/70   Pulse: 62   Resp: 16   Temp: 37 °C (98.6 °F)   TempSrc: Core   SpO2: 98%   Weight: 85.7 kg (189 lb)   Height: 1.829 m (6')   PainSc: 0-No pain      BMI:Body mass index is 25.63 kg/m².   General Cardiology:  General Appearance: Alert, oriented and in no acute distress.  HEENT: extra ocular movements intact (EOMI), pupils equal,  round, reactive to light and accommodation (PERRLA).  Carotid Upstroke: no bruit, normal.  Jugular Venous Distention (JVD): flat.  Chest: normal.  Lungs: Clear to auscultation,   Heart Sounds: no S3 or S4, normal S1, S2, regular rate.  Murmur, Click, Gallop: soft systolic murmur.  Abdomen: no hepatomegaly, no masses felt, soft.  Extremities: no leg edema.  Peripheral pulses: 2 plus bilateral.  NEUROLOGY Cranial nerves II-XII grossly intact.     Patient Active Problem List   Diagnosis    Angina pectoris    Anterior epistaxis    Coronary atherosclerosis    Blepharitis    Choroidal nevus, left eye    Chronic laryngitis    Cobblestone retinal degeneration    Disturbance, visual, subjective    Dry eyes    Dysphagia    Essential hypertension    Fatigue    GERD (gastroesophageal reflux disease)    Heart disease    Mixed hyperlipidemia    Lipoma of skin and subcutaneous tissue of trunk    Nuclear sclerosis of both eyes    Other  voice and resonance disorders    Pain in joints of right hand    Refractive error    Shortness of breath    Vitreous floaters of both eyes    Wears glasses    Schatzki's ring    Abrasion of right ear canal    Eustachian tube dysfunction, right       Problem List Items Addressed This Visit       Coronary atherosclerosis - Primary    Essential hypertension    Mixed hyperlipidemia      63-year-old patient with history of CAD, PCI of LAD in the past.  Patient was on blood pressure meds which has been stopped due to normalizing numbers.  Patient currently on a statin therapy.  No active chest pain tightness.    1.  Hyperlipidemia: Under control with medication.  Patient currently on Lipitor 80 mg tablet once a day.  2.  CAD: Continue current aspirin 81 mg once a day.  Patient may benefit from low-dose of beta-blocker.  3.  Preop clearance for left shoulder surgery: Patient stable cardiac wise, continue beta-blocker or rate control medication.  Moderate risk for CV events during the surgery.  No contraindication for surgery.  Hold blood thinner 48 hours before surgery.    Advised patient to avoid lunch meats, canned soups, pizzas, bread rolls, and sandwiches. Advised patient to limit salt intake 1,500 mg daily. Advised patient to exercise 30 mins/3 times a week including treadmill or aerobic type, Goal to achieve 65% target HR.    Diet and exercise reviewed with patient..advice to walk about 10,000 steps or about 2 hours during day time. Cut back on salt, sugar and flour.    Pt. care time is spent includes review of diagnostic tests, labs, radiographs, EKGs, old echoes, cardiac work-up and coordination of care. Assessment, impression and plans are reflected in the note above as well as the orders.    Roland Figueroa MD

## 2024-12-10 ENCOUNTER — TELEPHONE (OUTPATIENT)
Dept: CARDIOLOGY | Facility: CLINIC | Age: 64
End: 2024-12-10

## 2024-12-10 ENCOUNTER — PRE-ADMISSION TESTING (OUTPATIENT)
Dept: PREADMISSION TESTING | Facility: HOSPITAL | Age: 64
End: 2024-12-10
Payer: COMMERCIAL

## 2024-12-10 VITALS
OXYGEN SATURATION: 99 % | HEART RATE: 51 BPM | HEIGHT: 72 IN | SYSTOLIC BLOOD PRESSURE: 93 MMHG | RESPIRATION RATE: 16 BRPM | WEIGHT: 187 LBS | TEMPERATURE: 97 F | BODY MASS INDEX: 25.33 KG/M2 | DIASTOLIC BLOOD PRESSURE: 54 MMHG

## 2024-12-10 DIAGNOSIS — Z01.818 PREOPERATIVE TESTING: Primary | ICD-10-CM

## 2024-12-10 DIAGNOSIS — M75.122 NONTRAUMATIC COMPLETE TEAR OF LEFT ROTATOR CUFF: ICD-10-CM

## 2024-12-10 LAB
ANION GAP SERPL CALCULATED.3IONS-SCNC: 9 MMOL/L (ref 10–20)
ATRIAL RATE: 48 BPM
BUN SERPL-MCNC: 21 MG/DL (ref 6–23)
CALCIUM SERPL-MCNC: 9 MG/DL (ref 8.6–10.3)
CHLORIDE SERPL-SCNC: 105 MMOL/L (ref 98–107)
CO2 SERPL-SCNC: 28 MMOL/L (ref 21–32)
CREAT SERPL-MCNC: 0.93 MG/DL (ref 0.5–1.3)
EGFRCR SERPLBLD CKD-EPI 2021: >90 ML/MIN/1.73M*2
ERYTHROCYTE [DISTWIDTH] IN BLOOD BY AUTOMATED COUNT: 12.3 % (ref 11.5–14.5)
GLUCOSE SERPL-MCNC: 109 MG/DL (ref 74–99)
HCT VFR BLD AUTO: 40.5 % (ref 41–52)
HGB BLD-MCNC: 13.3 G/DL (ref 13.5–17.5)
MCH RBC QN AUTO: 30.9 PG (ref 26–34)
MCHC RBC AUTO-ENTMCNC: 32.8 G/DL (ref 32–36)
MCV RBC AUTO: 94 FL (ref 80–100)
NRBC BLD-RTO: 0 /100 WBCS (ref 0–0)
P AXIS: 72 DEGREES
P OFFSET: 169 MS
P ONSET: 114 MS
PLATELET # BLD AUTO: 189 X10*3/UL (ref 150–450)
POTASSIUM SERPL-SCNC: 4 MMOL/L (ref 3.5–5.3)
PR INTERVAL: 202 MS
Q ONSET: 215 MS
QRS COUNT: 8 BEATS
QRS DURATION: 104 MS
QT INTERVAL: 420 MS
QTC CALCULATION(BAZETT): 375 MS
QTC FREDERICIA: 389 MS
R AXIS: 68 DEGREES
RBC # BLD AUTO: 4.3 X10*6/UL (ref 4.5–5.9)
SODIUM SERPL-SCNC: 138 MMOL/L (ref 136–145)
T AXIS: 71 DEGREES
T OFFSET: 425 MS
VENTRICULAR RATE: 48 BPM
WBC # BLD AUTO: 4.8 X10*3/UL (ref 4.4–11.3)

## 2024-12-10 PROCEDURE — 36415 COLL VENOUS BLD VENIPUNCTURE: CPT

## 2024-12-10 PROCEDURE — 93010 ELECTROCARDIOGRAM REPORT: CPT | Performed by: INTERNAL MEDICINE

## 2024-12-10 PROCEDURE — 87081 CULTURE SCREEN ONLY: CPT | Mod: TRILAB

## 2024-12-10 PROCEDURE — 85027 COMPLETE CBC AUTOMATED: CPT

## 2024-12-10 PROCEDURE — 80048 BASIC METABOLIC PNL TOTAL CA: CPT

## 2024-12-10 PROCEDURE — 93005 ELECTROCARDIOGRAM TRACING: CPT

## 2024-12-10 RX ORDER — PETROLATUM,WHITE/LANOLIN
1 OINTMENT (GRAM) TOPICAL DAILY
COMMUNITY

## 2024-12-10 RX ORDER — CHLORHEXIDINE GLUCONATE ORAL RINSE 1.2 MG/ML
SOLUTION DENTAL
Qty: 473 ML | Refills: 0 | Status: SHIPPED | OUTPATIENT
Start: 2024-12-10

## 2024-12-10 ASSESSMENT — DUKE ACTIVITY SCORE INDEX (DASI)
CAN YOU PARTICIPATE IN STRENOUS SPORTS LIKE SWIMMING, SINGLES TENNIS, FOOTBALL, BASKETBALL, OR SKIING: YES
CAN YOU DO HEAVY WORK AROUND THE HOUSE LIKE SCRUBBING FLOORS OR LIFTING AND MOVING HEAVY FURNITURE: YES
CAN YOU DO MODERATE WORK AROUND THE HOUSE LIKE VACUUMING, SWEEPING FLOORS OR CARRYING GROCERIES: YES
CAN YOU PARTICIPATE IN MODERATE RECREATIONAL ACTIVITIES LIKE GOLF, BOWLING, DANCING, DOUBLES TENNIS OR THROWING A BASEBALL OR FOOTBALL: YES
CAN YOU HAVE SEXUAL RELATIONS: YES
CAN YOU DO LIGHT WORK AROUND THE HOUSE LIKE DUSTING OR WASHING DISHES: YES
CAN YOU TAKE CARE OF YOURSELF (EAT, DRESS, BATHE, OR USE TOILET): YES
CAN YOU CLIMB A FLIGHT OF STAIRS OR WALK UP A HILL: YES
CAN YOU DO YARD WORK LIKE RAKING LEAVES, WEEDING OR PUSHING A MOWER: YES
DASI METS SCORE: 9.9
CAN YOU RUN A SHORT DISTANCE: YES
TOTAL_SCORE: 58.2
CAN YOU WALK INDOORS, SUCH AS AROUND YOUR HOUSE: YES
CAN YOU WALK A BLOCK OR TWO ON LEVEL GROUND: YES

## 2024-12-10 ASSESSMENT — ENCOUNTER SYMPTOMS
EYES NEGATIVE: 1
CARDIOVASCULAR NEGATIVE: 1
ACTIVITY CHANGE: 1
RESPIRATORY NEGATIVE: 1
NEUROLOGICAL NEGATIVE: 1
GASTROINTESTINAL NEGATIVE: 1
PSYCHIATRIC NEGATIVE: 1
ARTHRALGIAS: 1

## 2024-12-10 NOTE — PREPROCEDURE INSTRUCTIONS
Why must I stop eating and drinking near surgery time?  With sedation, food or liquid in your stomach can enter your lungs causing serious complications  Increases nausea and vomiting    When do I need to stop eating and drinking before my surgery?   Do not eat or drink after midnight the night before your surgery/procedure.  You may have small sips of water to take your medication.      PAT DISCHARGE INSTRUCTIONS    Please call the Same Day Surgery (SDS) Department of the hospital where your procedure will be performed after 2:00 PM the day before your surgery. If you are scheduled on a Monday, or a Tuesday following a Monday holiday, you will need to call on the last business day prior to your surgery.      [facility]  [id number]  [place] [id number]  [phone]    Please let your surgeon know if:      You develop any open sores, shingles, burning or painful urination as these may increase your risk of an infection.   You no longer wish to have the surgery.   Any other personal circumstances change that may lead to the need to cancel or defer this surgery-such as being sick or getting admitted to any hospital within one week of your planned procedure.    Your contact details change, such as a change of address or phone number.    Starting now:     Please DO NOT drink alcohol or smoke for 24 hours before surgery. It is well known that quitting smoking can make a huge difference to your health and recovery from surgery. The longer you abstain from smoking, the better your chances of a healthy recovery. If you need help with quitting, call 2-584-QUIT-NOW to be connected to a trained counselor who will discuss the best methods to help you quit.     Before your surgery:    Please stop all supplements 7 days prior to surgery. Or as directed by your surgeon.   Please stop taking NSAID pain medicine such as Advil and Motrin 7 days before surgery.    If you develop any  fever, cough, cold, rashes, cuts, scratches, scrapes, urinary symptoms or infection anywhere on your body (including teeth and gums) prior to surgery, please call your surgeon’s office as soon as possible. This may require treatment to reduce the chance of cancellation on the day of surgery.    The day before your surgery:   DIET- Please follow the diet instructions at the top of your packet.   Get a good night’s rest.  Use the special soap for bathing if you have been instructed to use one.    Scheduled surgery times may change and you will be notified if this occurs - please check your personal voicemail for any updates.     On the morning of surgery:   Wear comfortable, loose fitting clothes which open in the front. Please do not wear moisturizers, creams, lotions, makeup or perfume.    Please bring with you to surgery:   Photo ID and insurance card   Current list of medicines and allergies   Pacemaker/ Defibrillator/Heart stent cards   CPAP machine and mask    Slings/ splints/ crutches   A copy of your complete advanced directive/DHPOA.    Please do NOT bring with you to surgery:   All jewelry and valuables should be left at home.   Prosthetic devices such as contact lenses, hearing aids, dentures, eyelash extensions, hairpins and body piercings must be removed prior to going in to the surgical suite.    After outpatient surgery:   A responsible adult MUST accompany you at the time of discharge and stay with you for 24 hours after your surgery. You may NOT drive yourself home after surgery.    Do not drive, operate machinery, make critical decisions or do activities that require co-ordination or balance until after a night’s sleep.   Do not drink alcoholic beverages for 24 hours.   Instructions for resuming your medications will be provided by your surgeon.    CALL YOUR DOCTOR AFTER SURGERY IF YOU HAVE:     Chills and/or a fever of 101° F or higher.    Redness, swelling, pus or drainage from your surgical wound  or a bad smell from the wound.    Lightheadedness, fainting or confusion.    Persistent vomiting (throwing up) and are not able to eat or drink for 12 hours.    Three or more loose, watery bowel movements in 24 hours (diarrhea).   Difficulty or pain while urinating( after non-urological surgery)    Pain and swelling in your legs, especially if it is only on one side.    Difficulty breathing or are breathing faster than normal.    Any new concerning symptoms.      Patient Information: Pre-Operative Infection Prevention Measures     Why did I have my nose, under my arms, and groin swabbed?  The purpose of the swab is to identify Staphylococcus aureus inside your nose or on your skin.  The swab was sent to the laboratory for culture.  A positive swab/culture for Staphylococcus aureus is called colonization or carriage.      What is Staphylococcus aureus?  Staphylococcus aureus, also known as “staph”, is a germ found on the skin or in the nose of healthy people.  Sometimes Staphylococcus aureus can get into the body and cause an infection.  This can be minor (such as pimples, boils, or other skin problems).  It might also be serious (such as a blood infection, pneumonia, or a surgical site infection).    What is Staphylococcus aureus colonization or carriage?  Colonization or carriage means that a person has the germ but is not sick from it.  These bacteria can be spread on the hands or when breathing or sneezing.    How is Staphylococcus aureus spread?  It is most often spread by close contact with a person or item that carries it.    What happens if my culture is positive for Staphylococcus aureus?  Your doctor/medical team will use this information to guide any antibiotic treatment which may be necessary.  Regardless of the culture results, we will clean the inside of your nose with a betadine swab just before you have your surgery.      Will I get an infection if I have Staphylococcus aureus in my nose or on my  skin?  Anyone can get an infection with Staphylococcus aureus.  However, the best way to reduce your risk of infection is to follow the instructions provided to you for the use of your CHG soap and dental rinse.        Patient Information: Oral/Dental Rinse    What is oral/dental rinse?   It is a mouthwash. It is a way of cleaning the mouth with a germ-killing solution before your surgery.  The solution contains chlorhexidine, commonly known as CHG.   It is used inside the mouth to kill a bacteria known as Staphylococcus aureus.  Let your doctor know if you are allergic to Chlorhexidine.    Why do I need to use CHG oral/dental rinse?  The CHG oral/dental rinse helps to kill a bacteria in your mouth known as Staphylococcus aureus.     This reduces the risk of infection at the surgical site.      Using your CHG oral/dental rinse  STEPS:  Use your CHG oral/dental rinse after you brush your teeth the night before (at bedtime) and the morning of your surgery.  Follow all directions on your prescription label.    Use the cap on the container to measure 15ml   Swish (gargle if you can) the mouthwash in your mouth for at least 30 seconds, (do not swallow) and spit out  After you use your CHG rinse, do not rinse your mouth with water, drink or eat.  Please refer to the prescription label for the appropriate time to resume oral intake      What side effects might I have using the CHG oral/dental rinse?  CHG rinse will stick to plaque on the teeth.  Brush and floss just before use.  Teeth brushing will help avoid staining of plaque during use.      Patient Information: Home Preoperative Antibacterial Shower      What is a home preoperative antibacterial shower?  This shower is a way of cleaning the skin with a germ-killing solution before surgery.  The solution contains chlorhexidine, commonly known as CHG.  CHG is a skin cleanser with germ-killing ability.  Let your doctor know if you are allergic to chlorhexidine.    Why do  I need to take a preoperative antibacterial shower?  Skin is not sterile.  It is best to try to make your skin as free of germs as possible before surgery.  Proper cleansing with a germ-killing soap before surgery can lower the number of germs on your skin.  This helps to reduce the risk of infection at the surgical site.  Following the instructions listed below will help you prepare your skin for surgery.      How do I use the solution?  Steps:  Begin using your CHG soap 5 days before your scheduled surgery on ________________________.    First, wash and rinse your hair using the CHG soap. Keep CHG soap away from ear canals and eyes.  Rinse completely, do not condition.  Hair extensions should be removed.  Wash your face with your normal soap and rinse.    Apply the CHG solution to a clean wet washcloth.  Turn the water off or move away from the water spray to avoid premature rinsing of the CHG soap as you are applying.   Firmly lather your entire body from the neck down.  Do not use on your face.  Pay special attention to the area(s) where your incision(s) will be located unless they are on your face.  Avoid scrubbing your skin too hard.  The important point is to have the CHG soap sit on your skin for 3 minutes.    When the 3 minutes are up, turn on the water and rinse the CHG solution off your body completely.   DO NOT wash with regular soap after you have used the CHG soap solution  Pat yourself dry with a clean, freshly-laundered towel.  DO NOT apply powders, deodorants, or lotions.  Dress in clean, freshly laundered nightclothes.    Be sure to sleep with clean, freshly laundered sheets.  Be aware that CHG will cause stains on fabrics; if you wash them with bleach after use.  Rinse your washcloth and other linens that have contact with CHG completely.  Use only non-chlorine detergents to launder the items used.   The morning of surgery is the fifth day.  Repeat the above steps and dress in clean comfortable  clothing     Whom should I contact if I have any questions regarding the use of CHG soap?  Call the [facility] at [phone] if you have any questions.                  Medication List            Accurate as of December 10, 2024  9:26 AM. Always use your most recent med list.                Adult Low Dose Aspirin 81 mg EC tablet  Generic drug: aspirin  Additional Medication Adjustments for Surgery: Take last dose 2 days before surgery  Notes to patient: PER DR PABLO CLEARANCE INSTRUCTIONS     albuterol 90 mcg/actuation inhaler  Medication Adjustments for Surgery: Take/Use as prescribed     atorvastatin 80 mg tablet  Commonly known as: Lipitor  Take 1 tablet (80 mg) by mouth once daily.  Notes to patient: Take evening dose before surgery.     cholecalciferol 5,000 Units tablet  Commonly known as: Vitamin D-3  Additional Medication Adjustments for Surgery: Take last dose 7 days before surgery     co-enzyme Q-10 30 mg capsule  Additional Medication Adjustments for Surgery: Take last dose 7 days before surgery     Fish OiL 1,000 (120-180) mg capsule  Generic drug: omega 3-dha-epa-fish oil  Additional Medication Adjustments for Surgery: Take last dose 7 days before surgery     fluticasone 50 mcg/actuation nasal spray  Commonly known as: Flonase  Medication Adjustments for Surgery: Take/Use as prescribed     glucosamine sulfate 500 mg capsule  Additional Medication Adjustments for Surgery: Take last dose 7 days before surgery     metoprolol succinate XL 25 mg 24 hr tablet  Commonly known as: Toprol-XL  Take 1 tablet (25 mg) by mouth once daily. Do not crush or chew.  Medication Adjustments for Surgery: Take on the morning of surgery     omeprazole 20 mg DR capsule  Commonly known as: PriLOSEC  Medication Adjustments for Surgery: Take on the morning of surgery     sildenafil 100 mg tablet  Commonly known as: Viagra  Medication Adjustments for Surgery:  Take last dose 3 days before surgery     SUPER B COMPLEX + C ORAL  Additional Medication Adjustments for Surgery: Take last dose 7 days before surgery     tamsulosin 0.4 mg 24 hr capsule  Commonly known as: Flomax  Medication Adjustments for Surgery: Take on the morning of surgery     TURMERIC ORAL  Additional Medication Adjustments for Surgery: Take last dose 7 days before surgery     valACYclovir 1 gram tablet  Commonly known as: Valtrex  Medication Adjustments for Surgery: Take on the morning of surgery     Vitamin B-12 5,000 mcg tablet, sublingual  Generic drug: cyanocobalamin (vitamin B-12)  Additional Medication Adjustments for Surgery: Take last dose 7 days before surgery     ZINC ORAL  Additional Medication Adjustments for Surgery: Take last dose 7 days before surgery

## 2024-12-10 NOTE — CPM/PAT H&P
"CPM/PAT Evaluation       Name: Luis Griffith (Luis Griffith)  /Age: 1960/63 y.o.     In-Person       Chief Complaint: Complete rotator cuff tear of left shoulder     HPI  Complete rotator cuff tear of left shoulder.  History of continued chronic progressive left shoulder pain and decreased range of motion despite undergoing left rotator cuff repair a year ago \"it didn't heal right\".  Symptoms increased with reaching or lifting motions interfering with sleep and ADLs. Multiple  conservative treatments/injection/therapy not helping. Denies fever, chills, or left upper extremity numbness. He is scheduled for arthroscopic left shoulder rotator cuff repair.      Past Medical History:   Diagnosis Date    Angina pectoris 2023    Asthma     Atherosclerotic heart disease of native coronary artery with other forms of angina pectoris 2023    Coronary atherosclerosis 2007    Formatting of this note might be different from the original. MI  Date:  PCI  Date:  - stent to his LAD Last Assessment & Plan: Formatting of this note might be different from the original. Assessment: States is well controlled with medication and denies any recent exacerbations Follows with PCP and cardiology History of MI with single stent placed in  Treated with ASA 81 mg Denies any    Essential hypertension 2023    GERD (gastroesophageal reflux disease)     Heart disease 2023    Mixed hyperlipidemia 2023    Unspecified blepharitis right eye, unspecified eyelid 10/08/2019    Blepharitis of both eyes       Past Surgical History:   Procedure Laterality Date    HERNIA REPAIR      MR ATHROGRAM SHOULDER LEFT W FL GUIDED INJECTION Left 2015    MR SHOULDER ARTHROGRAM LEFT W FL GUIDED INJECTION [facility]    ROTATOR CUFF REPAIR Bilateral          No Known Allergies    Current Outpatient Medications   Medication Sig Dispense Refill    albuterol 90 mcg/actuation inhaler Inhale 2 puffs " every 6 hours if needed for shortness of breath or wheezing.      aspirin (Adult Low Dose Aspirin) 81 mg EC tablet Take 1 tablet (81 mg) by mouth once daily.      atorvastatin (Lipitor) 80 mg tablet Take 1 tablet (80 mg) by mouth once daily. (Patient taking differently: Take 0.5 tablets (40 mg) by mouth once daily.) 90 tablet 3    chlorhexidine (Peridex) 0.12 % solution Use cap to measure 15 mL.  Swish/gargle mouthwash for at least 30 seconds.  Do not swallow.  Use night before surgery after brushing teeth and morning of surgery after brushing teeth. 473 mL 0    cholecalciferol (Vitamin D-3) 5,000 Units tablet Take 1 tablet (5,000 Units) by mouth once daily.      co-enzyme Q-10 30 mg capsule Take 1 capsule (30 mg) by mouth once daily.      cyanocobalamin, vitamin B-12, (Vitamin B-12) 5,000 mcg tablet, sublingual Place 1 tablet under the tongue once daily.      fluticasone (Flonase) 50 mcg/actuation nasal spray Administer 2 sprays into each nostril once daily.      glucosamine sulfate 500 mg capsule Take 1 tablet by mouth once daily.      metoprolol succinate XL (Toprol-XL) 25 mg 24 hr tablet Take 1 tablet (25 mg) by mouth once daily. Do not crush or chew. 90 tablet 3    omega 3-dha-epa-fish oil (Fish OiL) 1,000 mg (120 mg-180 mg) capsule Take 1 capsule (1,000 mg) by mouth once daily.      omeprazole (PriLOSEC) 20 mg DR capsule Take 1 capsule (20 mg) by mouth once daily in the morning. Take before meals.      sildenafil (Viagra) 100 mg tablet Take 1 tablet (100 mg) by mouth if needed.      tamsulosin (Flomax) 0.4 mg 24 hr capsule Take 1 capsule (0.4 mg) by mouth 2 times a day.      TURMERIC ORAL Take 1 tablet by mouth once daily.      valACYclovir (Valtrex) 1 gram tablet Take 0.5 tablets (500 mg) by mouth once daily.      vitamin B complex vit C no.4 (SUPER B COMPLEX + C ORAL) Take 1 tablet by mouth once daily.      ZINC ORAL Take 20 mg by mouth once daily.       No current facility-administered medications for this  visit.          Review of Systems   Constitutional:  Positive for activity change.   HENT: Negative.     Eyes: Negative.         Glasses   Respiratory: Negative.     Cardiovascular: Negative.    Gastrointestinal: Negative.    Genitourinary: Negative.    Musculoskeletal:  Positive for arthralgias.        Left shoulder pain, decreased rom   Skin: Negative.    Neurological: Negative.    Psychiatric/Behavioral: Negative.          Physical Exam  Vitals (BP 96/54-patient recently started on metoprolol) reviewed.   HENT:      Head: Normocephalic and atraumatic.      Mouth/Throat:      Mouth: Mucous membranes are moist.   Eyes:      Pupils: Pupils are equal, round, and reactive to light.      Comments: glasses   Cardiovascular:      Rate and Rhythm: Regular rhythm. Bradycardia present.      Comments: HR 58 auscultated  Pulmonary:      Effort: Pulmonary effort is normal.      Breath sounds: Normal breath sounds.   Abdominal:      Palpations: Abdomen is soft.   Musculoskeletal:      Cervical back: Normal range of motion.      Comments: Decreased rom left shoulder   Skin:     General: Skin is warm and dry.   Neurological:      Mental Status: He is alert and oriented to person, place, and time.   Psychiatric:         Mood and Affect: Mood normal.          PAT AIRWAY:   Airway:     Mallampati::  I    Neck ROM::  Full  normal          BP 93/54 Comment: RIGHT  Pulse 51   Temp 36.1 °C (97 °F) (Temporal)   Resp 16   Ht 1.829 m (6')   Wt 84.8 kg (187 lb)   SpO2 99%   BMI 25.36 kg/m²         Stop Bang Score 4   CHADS: 2.8%  DASI risk score:   METS:   ROBBI: 0.6%  RCRI:0.9%  ASA: III  ARISCAT:1.6% score 19  PAT orders CBC, BMP, MRSA, EKG  EKG sinus bradycardia heart rate 48 (preliminary)    Assessment and Plan:     Complete rotator cuff tear of left shoulder Plan: Arthroscopic left shoulder rotator cuff repair  CAD/ MI 2005 s/p cardiac stent x 1 to LAD follows with Dr. Figueroa-cardiac clearance done 12/5/2024 Per Dr. Figueroa's note:  "patient to hold blood thinners 48 hours prior to surgery  Hypertension managed with metoprolol-recently started-patient states \"My blood pressure is never this low\" Denies symptoms-patient planning to contact cardiologist Dr. Figueroa  Hyperlipidemia managed with atorvastatin  History of asthma uses albuterol inhaler as needed  GERD takes omeprazole  BPH takes tamsulosin  BMI 25.63        "

## 2024-12-10 NOTE — TELEPHONE ENCOUNTER
Patient called in and was a little concerned because he is having surgery in a week and just went to pre-admission testing and his BP was 96/54 and HR  was 50. Patient stated its never been that low and thinks it could be because he just started taking the metoprolol. Please advise.     - Called patient back 12/10/2024 11:45 am- advised what Dr. Figueroa said:   He can cut the medicine and a half.  As long as patient is feeling okay continue half dose of medicine.  Thank you

## 2024-12-12 LAB — STAPHYLOCOCCUS SPEC CULT: NORMAL

## 2024-12-22 RX ORDER — TRANEXAMIC ACID 100 MG/ML
1000 INJECTION, SOLUTION INTRAVENOUS
Status: CANCELLED | OUTPATIENT
Start: 2024-12-22

## 2024-12-23 ENCOUNTER — ANESTHESIA EVENT (OUTPATIENT)
Dept: OPERATING ROOM | Facility: HOSPITAL | Age: 64
End: 2024-12-23
Payer: COMMERCIAL

## 2024-12-23 ENCOUNTER — HOSPITAL ENCOUNTER (OUTPATIENT)
Facility: HOSPITAL | Age: 64
Setting detail: OUTPATIENT SURGERY
Discharge: HOME | End: 2024-12-23
Attending: ORTHOPAEDIC SURGERY | Admitting: ORTHOPAEDIC SURGERY
Payer: COMMERCIAL

## 2024-12-23 ENCOUNTER — ANESTHESIA (OUTPATIENT)
Dept: OPERATING ROOM | Facility: HOSPITAL | Age: 64
End: 2024-12-23
Payer: COMMERCIAL

## 2024-12-23 VITALS
DIASTOLIC BLOOD PRESSURE: 90 MMHG | HEIGHT: 72 IN | RESPIRATION RATE: 10 BRPM | BODY MASS INDEX: 25.32 KG/M2 | HEART RATE: 51 BPM | OXYGEN SATURATION: 97 % | WEIGHT: 186.95 LBS | SYSTOLIC BLOOD PRESSURE: 139 MMHG | TEMPERATURE: 98.2 F

## 2024-12-23 DIAGNOSIS — M75.122 NONTRAUMATIC COMPLETE TEAR OF LEFT ROTATOR CUFF: Primary | ICD-10-CM

## 2024-12-23 PROCEDURE — 3600000009 HC OR TIME - EACH INCREMENTAL 1 MINUTE - PROCEDURE LEVEL FOUR: Performed by: ORTHOPAEDIC SURGERY

## 2024-12-23 PROCEDURE — 2500000004 HC RX 250 GENERAL PHARMACY W/ HCPCS (ALT 636 FOR OP/ED): Performed by: NURSE ANESTHETIST, CERTIFIED REGISTERED

## 2024-12-23 PROCEDURE — C1889 IMPLANT/INSERT DEVICE, NOC: HCPCS | Performed by: ORTHOPAEDIC SURGERY

## 2024-12-23 PROCEDURE — 3700000001 HC GENERAL ANESTHESIA TIME - INITIAL BASE CHARGE: Performed by: ORTHOPAEDIC SURGERY

## 2024-12-23 PROCEDURE — A29827 PR SHLDR ARTHROSCOP,SURG,W/ROTAT CUFF REPR: Performed by: NURSE ANESTHETIST, CERTIFIED REGISTERED

## 2024-12-23 PROCEDURE — 7100000010 HC PHASE TWO TIME - EACH INCREMENTAL 1 MINUTE: Performed by: ORTHOPAEDIC SURGERY

## 2024-12-23 PROCEDURE — 3600000004 HC OR TIME - INITIAL BASE CHARGE - PROCEDURE LEVEL FOUR: Performed by: ORTHOPAEDIC SURGERY

## 2024-12-23 PROCEDURE — C1781 MESH (IMPLANTABLE): HCPCS | Performed by: ORTHOPAEDIC SURGERY

## 2024-12-23 PROCEDURE — 64415 NJX AA&/STRD BRCH PLXS IMG: CPT | Performed by: ANESTHESIOLOGY

## 2024-12-23 PROCEDURE — 2500000005 HC RX 250 GENERAL PHARMACY W/O HCPCS: Performed by: NURSE ANESTHETIST, CERTIFIED REGISTERED

## 2024-12-23 PROCEDURE — 2780000003 HC OR 278 NO HCPCS: Performed by: ORTHOPAEDIC SURGERY

## 2024-12-23 PROCEDURE — 7100000001 HC RECOVERY ROOM TIME - INITIAL BASE CHARGE: Performed by: ORTHOPAEDIC SURGERY

## 2024-12-23 PROCEDURE — 2500000004 HC RX 250 GENERAL PHARMACY W/ HCPCS (ALT 636 FOR OP/ED): Performed by: ANESTHESIOLOGY

## 2024-12-23 PROCEDURE — 2500000004 HC RX 250 GENERAL PHARMACY W/ HCPCS (ALT 636 FOR OP/ED): Performed by: ORTHOPAEDIC SURGERY

## 2024-12-23 PROCEDURE — 2500000005 HC RX 250 GENERAL PHARMACY W/O HCPCS: Performed by: ORTHOPAEDIC SURGERY

## 2024-12-23 PROCEDURE — A4649 SURGICAL SUPPLIES: HCPCS | Performed by: ORTHOPAEDIC SURGERY

## 2024-12-23 PROCEDURE — 2500000004 HC RX 250 GENERAL PHARMACY W/ HCPCS (ALT 636 FOR OP/ED): Mod: JZ | Performed by: ORTHOPAEDIC SURGERY

## 2024-12-23 PROCEDURE — 64999 UNLISTED PX NERVOUS SYSTEM: CPT | Performed by: ANESTHESIOLOGY

## 2024-12-23 PROCEDURE — 2720000007 HC OR 272 NO HCPCS: Performed by: ORTHOPAEDIC SURGERY

## 2024-12-23 PROCEDURE — A29827 PR SHLDR ARTHROSCOP,SURG,W/ROTAT CUFF REPR: Performed by: STUDENT IN AN ORGANIZED HEALTH CARE EDUCATION/TRAINING PROGRAM

## 2024-12-23 PROCEDURE — 7100000009 HC PHASE TWO TIME - INITIAL BASE CHARGE: Performed by: ORTHOPAEDIC SURGERY

## 2024-12-23 PROCEDURE — 7100000002 HC RECOVERY ROOM TIME - EACH INCREMENTAL 1 MINUTE: Performed by: ORTHOPAEDIC SURGERY

## 2024-12-23 PROCEDURE — 3700000002 HC GENERAL ANESTHESIA TIME - EACH INCREMENTAL 1 MINUTE: Performed by: ORTHOPAEDIC SURGERY

## 2024-12-23 PROCEDURE — C1713 ANCHOR/SCREW BN/BN,TIS/BN: HCPCS | Performed by: ORTHOPAEDIC SURGERY

## 2024-12-23 DEVICE — IMPLANTABLE DEVICE
Type: IMPLANTABLE DEVICE | Site: SHOULDER | Status: FUNCTIONAL
Brand: BIOBRACE 23MM

## 2024-12-23 RX ORDER — FENTANYL CITRATE 50 UG/ML
50 INJECTION, SOLUTION INTRAMUSCULAR; INTRAVENOUS ONCE AS NEEDED
Status: COMPLETED | OUTPATIENT
Start: 2024-12-23 | End: 2024-12-23

## 2024-12-23 RX ORDER — CEFAZOLIN SODIUM 2 G/100ML
2 INJECTION, SOLUTION INTRAVENOUS
Status: COMPLETED | OUTPATIENT
Start: 2024-12-23 | End: 2024-12-23

## 2024-12-23 RX ORDER — SODIUM CHLORIDE, SODIUM LACTATE, POTASSIUM CHLORIDE, CALCIUM CHLORIDE 600; 310; 30; 20 MG/100ML; MG/100ML; MG/100ML; MG/100ML
100 INJECTION, SOLUTION INTRAVENOUS CONTINUOUS
Status: DISCONTINUED | OUTPATIENT
Start: 2024-12-23 | End: 2024-12-23 | Stop reason: HOSPADM

## 2024-12-23 RX ORDER — OXYCODONE HYDROCHLORIDE 5 MG/1
5 TABLET ORAL ONCE AS NEEDED
Status: DISCONTINUED | OUTPATIENT
Start: 2024-12-23 | End: 2024-12-23 | Stop reason: HOSPADM

## 2024-12-23 RX ORDER — LIDOCAINE HYDROCHLORIDE 20 MG/ML
INJECTION, SOLUTION INFILTRATION; PERINEURAL AS NEEDED
Status: DISCONTINUED | OUTPATIENT
Start: 2024-12-23 | End: 2024-12-23

## 2024-12-23 RX ORDER — LIDOCAINE HYDROCHLORIDE 10 MG/ML
0.1 INJECTION, SOLUTION EPIDURAL; INFILTRATION; INTRACAUDAL; PERINEURAL ONCE
Status: DISCONTINUED | OUTPATIENT
Start: 2024-12-23 | End: 2024-12-23 | Stop reason: HOSPADM

## 2024-12-23 RX ORDER — TRANEXAMIC ACID 100 MG/ML
INJECTION, SOLUTION INTRAVENOUS AS NEEDED
Status: DISCONTINUED | OUTPATIENT
Start: 2024-12-23 | End: 2024-12-23

## 2024-12-23 RX ORDER — FENTANYL CITRATE 50 UG/ML
50 INJECTION, SOLUTION INTRAMUSCULAR; INTRAVENOUS EVERY 5 MIN PRN
Status: DISCONTINUED | OUTPATIENT
Start: 2024-12-23 | End: 2024-12-23 | Stop reason: HOSPADM

## 2024-12-23 RX ORDER — ALBUTEROL SULFATE 0.83 MG/ML
2.5 SOLUTION RESPIRATORY (INHALATION) ONCE AS NEEDED
Status: DISCONTINUED | OUTPATIENT
Start: 2024-12-23 | End: 2024-12-23 | Stop reason: HOSPADM

## 2024-12-23 RX ORDER — MIDAZOLAM HYDROCHLORIDE 1 MG/ML
1 INJECTION, SOLUTION INTRAMUSCULAR; INTRAVENOUS ONCE AS NEEDED
Status: DISCONTINUED | OUTPATIENT
Start: 2024-12-23 | End: 2024-12-23 | Stop reason: HOSPADM

## 2024-12-23 RX ORDER — ONDANSETRON HYDROCHLORIDE 2 MG/ML
4 INJECTION, SOLUTION INTRAVENOUS ONCE AS NEEDED
Status: DISCONTINUED | OUTPATIENT
Start: 2024-12-23 | End: 2024-12-23 | Stop reason: HOSPADM

## 2024-12-23 RX ORDER — MIDAZOLAM HYDROCHLORIDE 1 MG/ML
2 INJECTION, SOLUTION INTRAMUSCULAR; INTRAVENOUS ONCE
Status: COMPLETED | OUTPATIENT
Start: 2024-12-23 | End: 2024-12-23

## 2024-12-23 RX ORDER — IBUPROFEN 800 MG/1
800 TABLET ORAL 3 TIMES DAILY
Qty: 90 TABLET | Refills: 0 | Status: SHIPPED | OUTPATIENT
Start: 2024-12-23

## 2024-12-23 RX ORDER — MEPERIDINE HYDROCHLORIDE 25 MG/ML
12.5 INJECTION INTRAMUSCULAR; INTRAVENOUS; SUBCUTANEOUS EVERY 10 MIN PRN
Status: DISCONTINUED | OUTPATIENT
Start: 2024-12-23 | End: 2024-12-23 | Stop reason: HOSPADM

## 2024-12-23 RX ORDER — ROCURONIUM BROMIDE 10 MG/ML
INJECTION, SOLUTION INTRAVENOUS AS NEEDED
Status: DISCONTINUED | OUTPATIENT
Start: 2024-12-23 | End: 2024-12-23

## 2024-12-23 RX ORDER — HYDRALAZINE HYDROCHLORIDE 20 MG/ML
5 INJECTION INTRAMUSCULAR; INTRAVENOUS EVERY 30 MIN PRN
Status: DISCONTINUED | OUTPATIENT
Start: 2024-12-23 | End: 2024-12-23 | Stop reason: HOSPADM

## 2024-12-23 RX ORDER — ONDANSETRON HYDROCHLORIDE 2 MG/ML
INJECTION, SOLUTION INTRAVENOUS AS NEEDED
Status: DISCONTINUED | OUTPATIENT
Start: 2024-12-23 | End: 2024-12-23

## 2024-12-23 RX ORDER — PROPOFOL 10 MG/ML
INJECTION, EMULSION INTRAVENOUS AS NEEDED
Status: DISCONTINUED | OUTPATIENT
Start: 2024-12-23 | End: 2024-12-23

## 2024-12-23 RX ORDER — OXYCODONE AND ACETAMINOPHEN 5; 325 MG/1; MG/1
1 TABLET ORAL EVERY 6 HOURS PRN
Qty: 28 TABLET | Refills: 0 | Status: SHIPPED | OUTPATIENT
Start: 2024-12-23 | End: 2024-12-30

## 2024-12-23 RX ADMIN — POVIDONE-IODINE 1 APPLICATION: 5 SOLUTION TOPICAL at 12:03

## 2024-12-23 RX ADMIN — MIDAZOLAM 2 MG: 1 INJECTION INTRAMUSCULAR; INTRAVENOUS at 14:53

## 2024-12-23 RX ADMIN — FENTANYL CITRATE 50 MCG: 50 INJECTION INTRAMUSCULAR; INTRAVENOUS at 14:52

## 2024-12-23 SDOH — HEALTH STABILITY: MENTAL HEALTH: CURRENT SMOKER: 0

## 2024-12-23 ASSESSMENT — PAIN - FUNCTIONAL ASSESSMENT
PAIN_FUNCTIONAL_ASSESSMENT: 0-10
PAIN_FUNCTIONAL_ASSESSMENT: WONG-BAKER FACES
PAIN_FUNCTIONAL_ASSESSMENT: 0-10
PAIN_FUNCTIONAL_ASSESSMENT: WONG-BAKER FACES
PAIN_FUNCTIONAL_ASSESSMENT: 0-10
PAIN_FUNCTIONAL_ASSESSMENT: WONG-BAKER FACES
PAIN_FUNCTIONAL_ASSESSMENT: WONG-BAKER FACES
PAIN_FUNCTIONAL_ASSESSMENT: 0-10
PAIN_FUNCTIONAL_ASSESSMENT: WONG-BAKER FACES

## 2024-12-23 ASSESSMENT — PAIN SCALES - GENERAL
PAINLEVEL_OUTOF10: 0 - NO PAIN
PAIN_LEVEL: 0
PAINLEVEL_OUTOF10: 0 - NO PAIN

## 2024-12-23 ASSESSMENT — COLUMBIA-SUICIDE SEVERITY RATING SCALE - C-SSRS
6. HAVE YOU EVER DONE ANYTHING, STARTED TO DO ANYTHING, OR PREPARED TO DO ANYTHING TO END YOUR LIFE?: NO
2. HAVE YOU ACTUALLY HAD ANY THOUGHTS OF KILLING YOURSELF?: NO
1. IN THE PAST MONTH, HAVE YOU WISHED YOU WERE DEAD OR WISHED YOU COULD GO TO SLEEP AND NOT WAKE UP?: NO

## 2024-12-23 NOTE — ANESTHESIA PROCEDURE NOTES
Peripheral Block    Patient location during procedure: pre-op  Start time: 12/23/2024 2:57 PM  End time: 12/23/2024 3:01 PM  Reason for block: at surgeon's request and post-op pain management  Staffing  Performed: attending   Authorized by: Ravinder Childers MD    Performed by: Ravinder Childers MD  Preanesthetic Checklist  Completed: patient identified, IV checked, site marked, risks and benefits discussed, surgical consent, monitors and equipment checked, pre-op evaluation and timeout performed   Timeout performed at: 12/23/2024 2:50 PM  Peripheral Block  Patient position: sitting  Prep: ChloraPrep  Patient monitoring: heart rate, cardiac monitor and continuous pulse ox  Block type: brachial plexus and interscalene  Laterality: left  Injection technique: single-shot  Guidance: nerve stimulator and ultrasound guided  Local infiltration: lidocaine  Needle  Needle type: short-bevel   Needle gauge: 20 G  Needle length: 5 cm  Needle localization: anatomical landmarks, nerve stimulator and ultrasound guidance  Assessment  Injection assessment: negative aspiration for heme, no paresthesia on injection, incremental injection and local visualized surrounding nerve on ultrasound  Paresthesia pain: none  Heart rate change: no  Slow fractionated injection: yes

## 2024-12-23 NOTE — ANESTHESIA PREPROCEDURE EVALUATION
Patient: Luis Griffith    Procedure Information       Date/Time: 12/23/24 0706    Procedure: Repair Arthroscopy Rotator Cuff Shoulder (LINVATEC SHOULDER TRAY, CANNULAS, Y-KNOTS, CROSSFITS, OSCAR, POPLOCK ANCHORS, CONCEPT SUTURE PASSER, SPECTRUM HOOKS, CONMED BIOBRACE) (Left: Shoulder)    Location: [facility] / [facility]    Surgeons: Christiano Osorio, DO            Relevant Problems   Cardiac   (+) Angina pectoris   (+) Coronary atherosclerosis   (+) Essential hypertension   (+) Mixed hyperlipidemia      GI   (+) Dysphagia   (+) GERD (gastroesophageal reflux disease)       Clinical information reviewed:    Allergies                NPO Detail:  No data recorded     Physical Exam    Airway  Mallampati: II  TM distance: >3 FB  Neck ROM: full     Cardiovascular - normal exam     Dental - normal exam     Pulmonary - normal exam     Abdominal - normal exam           Anesthesia Plan    History of general anesthesia?: yes  History of complications of general anesthesia?: no    ASA 2     general     The patient is not a current smoker.  Patient was not previously instructed to abstain from smoking on day of procedure.  Patient did not smoke on day of procedure.  Education provided regarding risk of obstructive sleep apnea.  intravenous induction   Postoperative administration of opioids is intended.  Trial extubation is planned.  Anesthetic plan and risks discussed with patient.  Use of blood products discussed with patient who consented to blood products.    Plan discussed with CAA, attending and CRNA.

## 2024-12-23 NOTE — DISCHARGE INSTRUCTIONS
Rotator Cuff Injury Discharge Instructions    Postoperative bandage to remain in place x 3 days.  At that time bandages may be removed you may then shower allow your arm to pendulum in the shower allow soap and water to run over top of suture sites pat them dry do not scrub them do not soak them do not submerge them.  Pat them dry cover suture sites with Band-Aids.  Band-Aids will be removed for you at your first postoperative follow-up visit.    Abduction pillow sling to be used at all times coming out of the sling 3-4 times during the course of the day for pendulums hand wrist and elbow nonweightbearing range of motion.  No active motion of her shoulder.    Formal physical therapy should begin within 3-5 postoperative days phase 1 physical therapy will be passive motion x 6 weeks postsurgery    Position of sleep which is much as comfortable after shoulder surgery tends to be a recliner type position    No lifting pushing pulling with right upper extremity    Take pain medication as prescribed.    Call the office if any fevers chills nausea vomiting chest pain shortness of breath or other constitutional symptoms.    About this topic  The rotator cuff is made up of 4 of the muscles and tendons in your shoulder. It helps your shoulder move and be steady. Tendons are strong bands that connect muscles to bones. You may have a small tear or a total tear of the tendon or muscle. Both of these are a rotator cuff injury. You may have swelling and pain in your shoulder area. Some of the causes of this injury are:  Falling with an outstretched arm  Overuse or stress on the shoulder, especially when doing overhead movements like pitching, tennis, or swimming  Aging  Heavy lifting  Family history  Lack of blood supply  Bone spurs    What care is needed at home?  Ask your doctor what you need to do when you go home. Make sure you ask questions if you do not understand what the doctor says. This way you will know what you need  to do.  Rest your shoulder. Do not do painful motions that bother the shoulder. Your doctor may want you to use a sling to rest the joint. Wear your sling as ordered. Ask if it is OK to take the sling off for bathing, grooming, or doing your exercise program.  Place an ice pack or a bag of frozen peas wrapped in a towel over the painful part. Never put ice right on the skin. Do not leave the ice on more than 10 to 15 minutes at a time.  Prop your arm on pillows to help with swelling.  Follow the exercise program suggested by your doctor or therapist.  If you had surgery:  Talk to your doctor about how to care for your cut site and what things to avoid. Ask your doctor about:  When you should change your bandages  When you may take a bath or shower  If you need to be careful with lifting things over 10 pounds (4.5 kg)  When you may go back to your normal activities like work or driving  What follow-up care is needed?  Your doctor may ask you to make visits to the office to check on your progress. Be sure to keep these visits. You may also need to go to physical therapy to help you heal faster.  What drugs may be needed?  The doctor may order drugs to:  Help with pain and swelling  Fight or prevent an infection  Control muscle spasms  Will physical activity be limited?  You may have to limit your activity. Talk to your doctor about the right amount of activity for you. Ask your doctor when you may:  Return to school or work without using your affected arm  Begin to use your affected arm for regular activities  Be able to run, work out, or play sports  What can be done to prevent this health problem?  Take breaks often when doing things that use repeat movements.  Avoid activities like pitching or swimming. These can cause you to overuse your shoulder muscles. Follow pitch count guidelines if you are a pitcher.  Avoid lifting heavy objects.  Stretch your shoulders and arms before exercise and sports.  Exercise often to  strengthen all your muscles, including your shoulder.  When do I need to call the doctor?  Signs of infection. These include a fever of 100.4°F (38°C) or higher; chills; red, warm, or painful skin.  Swelling and pain does not go away even when you take a drug for it  Numbing or tingling feeling in your fingers or hands  Your hand or fingers change in color or are cool when touched  Teach Back: Helping You Understand  The Teach Back Method helps you understand the information we are giving you. After you talk with the staff, tell them in your own words what you learned. This helps to make sure the staff has described each thing clearly. It also helps to explain things that may have been confusing. Before going home, make sure you can do these:  I can tell you about my condition.  I can tell you what may help ease my pain.  I can tell you what I will do if I have more pain, numbness, tingling, or swelling or my fingers are cool or blue.

## 2024-12-23 NOTE — ANESTHESIA POSTPROCEDURE EVALUATION
Patient: Luis Griffith    Procedure Summary       Date: 12/23/24 Room / Location: [facility]    Anesthesia Start: 1555 Anesthesia Stop: 1813    Procedure: Repair Arthroscopy Rotator Cuff Shoulder (LINVATEC SHOULDER TRAY, CANNULAS, Y-KNOTS, CROSSFITS, OSCAR, POPLOCK ANCHORS, CONCEPT SUTURE PASSER, SPECTRUM HOOKS, CONMED BIOBRACE) (Left: Shoulder) Diagnosis:       Complete rotator cuff tear of left shoulder      (Complete rotator cuff tear of left shoulder [M75.122])    Surgeons: Christiano Osorio DO Responsible Provider: Pablito Mosqueda DO    Anesthesia Type: general, regional ASA Status: 2            Anesthesia Type: general, regional    Vitals Value Taken Time   /85 12/23/24 1808   Temp 36.5 °C (97.7 °F) 12/23/24 1808   Pulse 72 12/23/24 1808   Resp 17 12/23/24 1808   SpO2 92 % 12/23/24 1808       Anesthesia Post Evaluation    Patient location during evaluation: PACU  Patient participation: complete - patient participated  Level of consciousness: awake  Pain score: 0  Pain management: adequate  Multimodal analgesia pain management approach  Airway patency: patent  Two or more strategies used to mitigate risk of obstructive sleep apnea  Cardiovascular status: acceptable  Respiratory status: acceptable  Hydration status: acceptable  Postoperative Nausea and Vomiting: none  Comments: No Nausea        There were no known notable events for this encounter.

## 2024-12-23 NOTE — ANESTHESIA PROCEDURE NOTES
Peripheral Block    Patient location during procedure: pre-op  Start time: 12/23/2024 3:01 PM  End time: 12/23/2024 3:02 PM  Reason for block: at surgeon's request and post-op pain management  Staffing  Performed: attending   Authorized by: Ravinder Childers MD    Performed by: Ravinder Childers MD  Preanesthetic Checklist  Completed: patient identified, IV checked, site marked, risks and benefits discussed, surgical consent, monitors and equipment checked, pre-op evaluation and timeout performed   Timeout performed at: 12/23/2024 2:50 PM  Peripheral Block  Patient position: sitting  Prep: ChloraPrep  Patient monitoring: heart rate, cardiac monitor and continuous pulse ox  Block type: other  Laterality: left  Injection technique: single-shot  Guidance: nerve stimulator and ultrasound guided  Local infiltration: lidocaine  Needle  Needle type: short-bevel   Needle gauge: 20 G  Needle length: 5 cm  Needle localization: anatomical landmarks, nerve stimulator and ultrasound guidance  Assessment  Injection assessment: negative aspiration for heme, no paresthesia on injection, incremental injection and local visualized surrounding nerve on ultrasound  Paresthesia pain: none  Heart rate change: no  Slow fractionated injection: yes  Additional Notes  Left ICB block

## 2024-12-23 NOTE — NURSING NOTE
Pt vitals stable in phase 1, pt states no pain, no nausea, states a little challenging to breath at times (dr hunt aware) but pt saturations approp and deep coughing well, surgical arm approp/strong radial pulse, moves extrem well

## 2024-12-23 NOTE — ANESTHESIA PROCEDURE NOTES
Airway  Date/Time: 12/23/2024 4:05 PM  Urgency: elective    Airway not difficult    Staffing  Performed: CRNA   Authorized by: Ravinder Childers MD    Performed by: CLARISA Kwon-NIGEL  Patient location during procedure: OR    Indications and Patient Condition  Indications for airway management: anesthesia  Spontaneous ventilation: present  Sedation level: deep  Preoxygenated: yes  Patient position: sniffing  Mask difficulty assessment: 2 - vent by mask + OA or adjuvant +/- NMBA  Planned trial extubation    Final Airway Details  Final airway type: endotracheal airway      Successful airway: ETT  Cuffed: yes   Successful intubation technique: direct laryngoscopy  Blade: Celso  Blade size: #3  ETT size (mm): 7.5  Cormack-Lehane Classification: grade I - full view of glottis  Placement verified by: capnometry and palpation of cuff   Cuff volume (mL): 8  Measured from: lips  ETT to lips (cm): 24  Number of attempts at approach: 1

## 2024-12-23 NOTE — OP NOTE
Repair Arthroscopy Rotator Cuff Shoulder (LINVATEC SHOULDER TRAY, CANNULAS, Y-KNOTS, CROSSFITS, OSCAR, POPLOCK ANCHORS, CONCEPT SUTURE PASSER, SPECTRUM HOOKS, CONMED BIOBRACE) (L) Operative Note     Date: 2024  OR Location: [facility]    Name: Luis Griffith, : 1960, Age: 63 y.o., MRN: [id number], Sex: male    Diagnosis  Pre-op Diagnosis      * Complete rotator cuff tear of left shoulder [M75.122] Post-op Diagnosis     * Complete rotator cuff tear of left shoulder [M75.122]     Procedures  Repair Arthroscopy Rotator Cuff Shoulder (LINVATEC SHOULDER TRAY, CANNULAS, Y-KNOTS, CROSSFITS, OSCAR, POPLOCK ANCHORS, CONCEPT SUTURE PASSER, SPECTRUM HOOKS, CONMED BIOBRACE)  [address] - [place] SURGICAL ARTHROSCOPY SHOULDER W/ROTATOR CUFF RPR      Surgeons      * Christiano Osorio - Primary    Resident/Fellow/Other Assistant:  SA Lina    Staff:   Circulator: Jennifer  Scrub Person: Ramona  Scrub Person: China Finney Scrub: Annie  Scrub Person: Lina Finney Circulator: Linn    Anesthesia Staff: Anesthesiologist: Pablito Mosqueda DO; Ravinder Childers MD  CRNA: CLARISA Kwon-CRNA    Procedure Summary  Anesthesia: Regional, General  ASA: II  Estimated Blood Loss: 10mL  Intra-op Medications:   Administrations occurring from 1325 to 1530 on 24:   Medication Name Total Dose   fentaNYL PF (Sublimaze) injection 50 mcg 50 mcg   midazolam (Versed) injection 2 mg 2 mg              Anesthesia Record               Intraprocedure I/O Totals          Intake    LR bolus 1200.00 mL    Tranexamic Acid 10.00 mL    The total shown is the total volume documented since Anesthesia Start was filed.    ceFAZolin (Ancef) 2 g in dextrose (iso)  mL 100.00 mL    Total Intake 1310 mL          Specimen: No specimens collected              Drains and/or Catheters: * None in log *    Tourniquet Times:         Implants:  Implants       Type Name Action Serial No.       BIOBRACE BIOCOMPOSITE SOFT TISSUE SCAFFOLD 23MM X 30MM Implanted        4.75MM ALEJANDRA KNOTLESS ANCHOR Implanted       4.75MM ALEJANDRA KNOTLESS ANCHOR Implanted       4.0ML CTM FLOW CONNECTIVE TISSUE MATRIX Implanted SH              Findings: See dictation below    Indications: Luis Griffith is an 63 y.o. male who is having surgery for Complete rotator cuff tear of left shoulder [M75.122].  In the form of a left shoulder arthroscopy with revision rotator cuff repair and augmentation    The patient was seen in the preoperative area. The risks, benefits, complications, treatment options, non-operative alternatives, expected recovery and outcomes were discussed with the patient. The possibilities of reaction to medication, pulmonary aspiration, injury to surrounding structures, bleeding, recurrent infection, the need for additional procedures, failure to diagnose a condition, and creating a complication requiring transfusion or operation were discussed with the patient. The patient concurred with the proposed plan, giving informed consent.  The site of surgery was properly noted/marked if necessary per policy. The patient has been actively warmed in preoperative area. Preoperative antibiotics have been ordered and given within 1 hours of incision. Venous thrombosis prophylaxis have been ordered including bilateral sequential compression devices    Procedure Details: Date of surgery: 12/23/2024    Location: [facility]    Pre-Operative Diagnosis: Left shoulder recurrent supraspinatus rotator cuff tear    Post-Operative Diagnosis: Left shoulder recurrent supraspinatus rotator cuff tear    Procedure: Left shoulder arthroscopy with revision rotator cuff repair and augmentation    Implants:  1.  ConMed Linvatec 6.5 mm Genesys biocomposite triple loaded suture anchor x 1  2.  ConMed BioRez BioBrace 23 x 30 mm bio inductive implant x 1  3.  ConMed 4.75 mm peek Alejandra Knotless Anchors x 2  4. CTM SAEED 3cc Amnion Particulate    Surgeon: Christiano Osorio DO    First Assistant: Lina  SA    Intraoperative pathology and findings/operative indications:  Patient is a pleasant 63-year-old male who underwent left shoulder arthroscopy arthroscopic joint debridement subacromial decompression arthroscopic [place] arthroscopic rotator cuff repair of supraspinatus and infraspinatus and subpectoral long head biceps tenodesis performed by myself roughly a year and a half ago.  Patient did reasonably well postoperatively however at roughly 8 months postsurgery started noticing increased pain with a sudden feeling of weakness with overhead activity.  Physical exam revealed pain but no real weakness in isolated rotator cuff positions radiographs revealed mild degenerative glenohumeral joint changes.  Subsequent repeat MRI obtained 11 months postsurgery demonstrated advanced healing of infraspinatus leading edge of supraspinatus also.  Well-healed however the posterior aspect of supraspinatus at the junction to infraspinatus demonstrated full-thickness recurrent tear.  No fatty atrophy was appreciated.  Treatment options were discussed with continued pain and disability related to his left shoulder ultimately the patient did choose to move forward with revision surgery.  At the time of the procedure exam under anesthesia revealed no indication of adhesive capsulitis.  Arthroscopic exam revealed focal areas of grade 3 and 1 area of grade 4 chondral loss about the anterior weightbearing aspect of the humeral head.  Articular cartilage of the glenoid was reasonably well-maintained.  Some postsurgical scar noted within the rotator interval.  Long head biceps absent status post remote biceps tenodesis.  Mild degenerative changes noted circumferentially around the glenoid labrum.  Subacromial space demonstrated type I acromion status post acromioplasty copious scar appreciated in the subdeltoid space.  Infraspinatus was very well-healed.  Supraspinatus demonstrated healing of the anterior cable tissue however there  was a U-shaped recurrent tear approximately 1 cm posterior to the anterior supraspinatus footprint which extended posteriorly to the leading edge of infraspinatus.  A all suture anchor was found attached to the supraspinatus tendon with 3 sutures remaining intact attached to the tendon.  There was no clinical indication of infection at time of the procedure.    Procedure in detail:  The patient was correctly identified marked in preoperative holding risk benefits alternatives and reasonable expectations for outcomes have previously been discussed.  Also in preoperative holding the patient received regional nerve block by the department of anesthesia.  Patient was then escorted to operative Walt. 4 at [facility].  Once in the operative suite surgical pause/amount was performed preoperative antibiotics were administered intravenous TXA was administered and general anesthetic with endotracheal ovation was then provided by the department of anesthesia.  Exam under anesthesia was performed, pathology as noted above.  Patient then transition to the left lateral cubitus position, all bony prominences well-padded axillary roll utilized right common peroneal nerve floated free.  Held in position on a beanbag.  Left upper extremity then suspended held at roughly 45 degrees of abduction slight forward flexion utilizing 15 pounds of longitudinal traction on the fishing pole to achieve balance suspension of the left glenohumeral joint.  Left shoulder and upper extremity then sterilely prepped and draped in standard fashion anatomic landmarks identified marked with sterile marking pen.  At this time standard posterior portal was established with 11 blade followed by insertion of blunt trocar cannula shoulder joint was insufflated camera was inserted 15 point diagnostic arthroscopy of the shoulder was performed, pathology was as noted above.  Spinal needle localization was used to identify the starting point for an  anterior mid glenoid portal in line with the acromioclavicular joint.  Skin was incised with 11 blade probe was inserted repeat diagnostic arthroscopy was performed, pathology was as noted above.  At this time combination of arthroscopic shaver and radiofrequency wand were used to debride scar within the rotator interval scar and adhesion from the capsular surface of the rotator cuff to the superior labrum gentle chondroplasty of the humeral head was also performed for loose [place] articular cartilage lesion about the anterior superior weightbearing aspect the humeral head previously noted area of grade 4 chondral change.  At this time the camera is transition into the subacromial space through the posterior portal.  50 yard line portal was established after spinal needle localization.  Camera is then transition to the 50 yard line portal after scar on the deep surface of the acromion had been debrided with a radiofrequency wand.  Radiofrequency wand was then inserted through the posterior portal lysis of adhesions around the posterior bursal curtain was then performed 5.5 mm arthroscopic shaver then inserted through the posterior portal subacromial bursectomy was then completed.  5.5 mm full-radius shaver was then used to gently decorticate the undersurface of the acromion.  At this time a accessory posterior lateral viewing port was established after spinal needle localization camera transition to this portal over top of the switching stick.  7 mm disposable cannulas were then inserted through the anterior portal and the 50 yard line portal.  Previously noted suture material and old all suture anchor bodies were then removed with an arthroscopic scissors and grasper.  At this time greater tuberosity footprint of supraspinatus was decorticated with arthroscopic shaver.  Spinal needle localization was used to identify the starting point for a  hole for anchor placement skin was incised with 11 blade.  4.5 mm  punch was used to create a  hole in the supraspinatus footprint into which a 6.5 mm CrossFT biocomposite anchor was then inserted.  This was a triple loaded anchor.  3 strands of suture were then passed through the anterior supraspinatus portion of the U-shaped tear the corresponding 3 tails were passed with a Concept suture passer creating horizontal mattress configurations once all sutures had been passed they were then tied with sliding locking SMC knots followed by 3 alternating half hitches and suture tails were cut short.  At this time a Spectrum hook was used to shuttle 2 sutures through the myotendinous junction 1 through supraspinatus the other 1 through infraspinatus these were taken out through the 50 yard line cannula.  The cannula was then removed.  The sutures were then passed through a bio brace and simple stitch fashion and mulberry stick knots were tied.  2 simple stitches were placed on the lateral aspect of the bio brace.  The bio brace was then shuttled into the shoulder through the 50 yard line portal.  The [place] stick knots were then retrieved and tied with Revo arthroscopic knots and simple stitch fashion securing the medial aspect of the bio brace to the native myotendinous junction of the rotator cuff.  At this time the 2 simple stitches which had been preloaded onto the bio brace were then secured to lateral row knotless anchors utilizing 4.75 mm peek knotless anchors 1 anteriorly the other 1 posteriorly.  Suture tails were then all cut short.  Repeat dynamic exam revealed excellent repair of the recurrent rotator cuff tear with excellent reinforcement with the bio brace augment.  At this time a spinal needle was fed percutaneously through the bio brace augment into the rotator cuff tear arthroscopic fluid was turned off suction applied to the camera excess fluid then removed from the shoulder with suction.  Instrumentation then removed from the shoulder.  Portal sites were closed  with 3-0 Prolene in portal stitch fashion.  Once all the portals were closed 3 cc of CTM flow were then injected into the tear site to aid in healing.  At this time spinal needle was removed.  Sterile dressing was then applied in the form of bacitracin, Adaptic, 4 x 4's, ABDs, foam tape.  Patient was placed in a abduction pillow with sling and swath transferred onto his back awoken extubated transferred to [facility] and returned to PACU in stable condition.  Counts are correct case was clean elective complications were none specimens were none estimated blood loss was 10 cc.      Complications:  None; patient tolerated the procedure well.    Disposition: PACU - hemodynamically stable.  Condition: stable                 Additional Details:     Attending Attestation: I performed the procedure.    Christiano Osorio  Phone Number: [phone]

## 2024-12-23 NOTE — DISCHARGE SUMMARY
Discharge Diagnosis  Status post left arthroscopic revision rotator cuff repair    Issues Requiring Follow-Up  Postoperative follow-up left shoulder    Test Results Pending At Discharge  Pending Labs       No current pending labs.            Hospital Course   The patient is a pleasant 63-year-old male who underwent an elective left shoulder arthroscopy with revision rotator cuff repair performed by Dr. Osorio at [facility] on 12/23/2024.  Patient had a routine uncomplicated preoperative, intraoperative and immediate postoperative surgical course.  Patient received preoperative and postoperative intravenous antibiotics.  Pain control is with a combination of both oral and IV narcotic and nonnarcotic medications.  DVT prophylaxis was with sequentials early ambulatioN.   Patient was discharged home, outpatient follow-up is being arranged for 1 week in the outpatient clinic postdischarge.      Pertinent Physical Exam At Time of Discharge  Physical Exam  Bandage left upper extremity clean dry intact.  Distally neurovascular intact compartments soft supple.  Motor blockade secondary to regional nerve block noted.  Home Medications     Medication List      ASK your doctor about these medications     Adult Low Dose Aspirin 81 mg EC tablet; Generic drug: aspirin   albuterol 90 mcg/actuation inhaler   atorvastatin 80 mg tablet; Commonly known as: Lipitor; Take 1 tablet (80   mg) by mouth once daily.   chlorhexidine 0.12 % solution; Commonly known as: Peridex; Use cap to   measure 15 mL.  Swish/gargle mouthwash for at least 30 seconds.  Do not   swallow.  Use night before surgery after brushing teeth and morning of   surgery after brushing teeth.   cholecalciferol 5,000 Units tablet; Commonly known as: Vitamin D-3   co-enzyme Q-10 30 mg capsule   Fish OiL 1,000 (120-180) mg capsule; Generic drug: omega 3-dha-epa-fish   oil   fluticasone 50 mcg/actuation nasal spray; Commonly known as: Flonase   glucosamine sulfate  500 mg capsule   metoprolol succinate XL 25 mg 24 hr tablet; Commonly known as:   Toprol-XL; Take 1 tablet (25 mg) by mouth once daily. Do not crush or   chew.   omeprazole 20 mg DR capsule; Commonly known as: PriLOSEC   sildenafil 100 mg tablet; Commonly known as: Viagra   SUPER B COMPLEX + C ORAL   tamsulosin 0.4 mg 24 hr capsule; Commonly known as: Flomax   TURMERIC ORAL   valACYclovir 1 gram tablet; Commonly known as: Valtrex   Vitamin B-12 5,000 mcg tablet, sublingual; Generic drug: cyanocobalamin   (vitamin B-12)   ZINC ORAL       Outpatient Follow-Up  Future Appointments   Date Time Provider Department Center   6/5/2025 10:30 AM Roland Figueroa MD [id number]     Postoperative follow-up appointment 1/2/2025 at 10:45 AM-[facility]  Christiano Osorio DO

## 2024-12-23 NOTE — NURSING NOTE
Pt arrived to PACU, orders reviewed, pt stable.    1915-Patient in Phase 2; dressed and up to chair with RN assist. Tolerating po fluids, no complaint of pain and no complaint of nausea.     Significant other at bedside; discussed discharge instructions with patient and Significant other. All questions at this time answered.     Patient clinically appropriate for discharge. IV removed and patient transported to discharge area via wheelchair.

## 2025-04-03 ENCOUNTER — TELEPHONE (OUTPATIENT)
Age: 65
End: 2025-04-03
Payer: COMMERCIAL

## 2025-04-03 DIAGNOSIS — E78.2 MIXED HYPERLIPIDEMIA: ICD-10-CM

## 2025-04-03 RX ORDER — ATORVASTATIN CALCIUM 80 MG/1
80 TABLET, FILM COATED ORAL DAILY
Qty: 90 TABLET | Refills: 3 | Status: SHIPPED | OUTPATIENT
Start: 2025-04-03 | End: 2026-03-29

## 2025-04-03 NOTE — TELEPHONE ENCOUNTER
Rx Refill-90 days                  Next Appt: 6/9/25@9:15 am Dr. Figueroa    Pharmacy-  [facility]/pharmacy #[id number] - [place], [place] - [address] AT [facility] Phone: [phone]   Fax: [phone]        Medication-   Dispensed Days Supply Quantity Provider Pharmacy   ATORVASTATIN 80 MG TABLET 10/07/2024 90 90 each Roland Figueroa MD [facility]/pharmacy #[id number] - [place]...     Patient is completely out of this medication.

## 2025-05-29 ENCOUNTER — TELEPHONE (OUTPATIENT)
Dept: CARDIOLOGY | Facility: CLINIC | Age: 65
End: 2025-05-29
Payer: COMMERCIAL

## 2025-06-05 ENCOUNTER — APPOINTMENT (OUTPATIENT)
Facility: CLINIC | Age: 65
End: 2025-06-05
Payer: COMMERCIAL

## 2025-06-30 ENCOUNTER — APPOINTMENT (OUTPATIENT)
Age: 65
End: 2025-06-30
Payer: COMMERCIAL

## 2025-06-30 VITALS
HEIGHT: 72 IN | DIASTOLIC BLOOD PRESSURE: 72 MMHG | HEART RATE: 56 BPM | RESPIRATION RATE: 18 BRPM | OXYGEN SATURATION: 97 % | SYSTOLIC BLOOD PRESSURE: 108 MMHG | BODY MASS INDEX: 25.35 KG/M2 | TEMPERATURE: 98.6 F

## 2025-06-30 DIAGNOSIS — J45.909 UNCOMPLICATED ASTHMA, UNSPECIFIED ASTHMA SEVERITY, UNSPECIFIED WHETHER PERSISTENT (HHS-HCC): ICD-10-CM

## 2025-06-30 DIAGNOSIS — I25.118 ATHEROSCLEROSIS OF CORONARY ARTERY OF NATIVE HEART WITH STABLE ANGINA PECTORIS, UNSPECIFIED VESSEL OR LESION TYPE: Primary | ICD-10-CM

## 2025-06-30 DIAGNOSIS — E78.2 MIXED HYPERLIPIDEMIA: ICD-10-CM

## 2025-06-30 DIAGNOSIS — I10 ESSENTIAL HYPERTENSION: ICD-10-CM

## 2025-06-30 PROCEDURE — 1036F TOBACCO NON-USER: CPT | Performed by: INTERNAL MEDICINE

## 2025-06-30 PROCEDURE — 99214 OFFICE O/P EST MOD 30 MIN: CPT | Performed by: INTERNAL MEDICINE

## 2025-06-30 PROCEDURE — 3074F SYST BP LT 130 MM HG: CPT | Performed by: INTERNAL MEDICINE

## 2025-06-30 PROCEDURE — 3078F DIAST BP <80 MM HG: CPT | Performed by: INTERNAL MEDICINE

## 2025-06-30 ASSESSMENT — ENCOUNTER SYMPTOMS
OCCASIONAL FEELINGS OF UNSTEADINESS: 0
LOSS OF SENSATION IN FEET: 0
DEPRESSION: 0

## 2025-06-30 ASSESSMENT — LIFESTYLE VARIABLES
AUDIT TOTAL SCORE: 2
HOW OFTEN DO YOU HAVE A DRINK CONTAINING ALCOHOL: 2-4 TIMES A MONTH
HOW OFTEN DURING THE LAST YEAR HAVE YOU NEEDED AN ALCOHOLIC DRINK FIRST THING IN THE MORNING TO GET YOURSELF GOING AFTER A NIGHT OF HEAVY DRINKING: NEVER
HOW OFTEN DURING THE LAST YEAR HAVE YOU FAILED TO DO WHAT WAS NORMALLY EXPECTED FROM YOU BECAUSE OF DRINKING: NEVER
HOW OFTEN DURING THE LAST YEAR HAVE YOU BEEN UNABLE TO REMEMBER WHAT HAPPENED THE NIGHT BEFORE BECAUSE YOU HAD BEEN DRINKING: NEVER
HAVE YOU OR SOMEONE ELSE BEEN INJURED AS A RESULT OF YOUR DRINKING: NO
HOW OFTEN DURING THE LAST YEAR HAVE YOU FOUND THAT YOU WERE NOT ABLE TO STOP DRINKING ONCE YOU HAD STARTED: NEVER
HOW OFTEN DURING THE LAST YEAR HAVE YOU HAD A FEELING OF GUILT OR REMORSE AFTER DRINKING: NEVER
SKIP TO QUESTIONS 9-10: 1
HAS A RELATIVE, FRIEND, DOCTOR, OR ANOTHER HEALTH PROFESSIONAL EXPRESSED CONCERN ABOUT YOUR DRINKING OR SUGGESTED YOU CUT DOWN: NO
AUDIT-C TOTAL SCORE: 2
HOW MANY STANDARD DRINKS CONTAINING ALCOHOL DO YOU HAVE ON A TYPICAL DAY: 1 OR 2
HOW OFTEN DO YOU HAVE SIX OR MORE DRINKS ON ONE OCCASION: NEVER

## 2025-06-30 ASSESSMENT — PATIENT HEALTH QUESTIONNAIRE - PHQ9
SUM OF ALL RESPONSES TO PHQ9 QUESTIONS 1 AND 2: 0
1. LITTLE INTEREST OR PLEASURE IN DOING THINGS: NOT AT ALL
2. FEELING DOWN, DEPRESSED OR HOPELESS: NOT AT ALL

## 2025-06-30 ASSESSMENT — COLUMBIA-SUICIDE SEVERITY RATING SCALE - C-SSRS
2. HAVE YOU ACTUALLY HAD ANY THOUGHTS OF KILLING YOURSELF?: NO
1. IN THE PAST MONTH, HAVE YOU WISHED YOU WERE DEAD OR WISHED YOU COULD GO TO SLEEP AND NOT WAKE UP?: NO
6. HAVE YOU EVER DONE ANYTHING, STARTED TO DO ANYTHING, OR PREPARED TO DO ANYTHING TO END YOUR LIFE?: NO

## 2025-06-30 ASSESSMENT — PAIN SCALES - GENERAL: PAINLEVEL_OUTOF10: 0-NO PAIN

## 2025-06-30 NOTE — PROGRESS NOTES
[facility] [place]        Subjective   Chief Complaint   Patient presents with    Follow-up     6 Month      64-year-old patient with history of CAD, PCI of LAD done 2005.  No active chest pain tightness small vessel disease about 4 years ago diagnostic catheterization.  Patent stents in LAD.  History of left shoulder surgery.  Hernia surgery in past.  Stable cardiac wise currently on aspirin, statin therapy as well as a low-dose of beta-blocker.  No active angina or CHF signs symptoms.  Recently death in the family.    He has a past medical history of Angina pectoris (11/24/2023), Asthma, Atherosclerotic heart disease of native coronary artery with other forms of angina pectoris (11/24/2023), Coronary atherosclerosis (02/27/2007), Essential hypertension (11/24/2023), GERD (gastroesophageal reflux disease), Heart disease (11/24/2023), Hiatal hernia, Mixed hyperlipidemia (11/24/2023), Myocardial infarction (Multi), and Unspecified blepharitis right eye, unspecified eyelid (10/08/2019).  He has a past surgical history that includes MR athrogram shoulder left w FL guided injection (Left, 04/03/2015); Rotator cuff repair (Bilateral); Hernia repair; Appendectomy; Coronary stent placement; Cardiac surgery; and Cardiac catheterization.   No relevant family history has been documented for this patient.  Current Outpatient Medications   Medication Sig Dispense Refill    albuterol 90 mcg/actuation inhaler Inhale 2 puffs every 6 hours if needed for shortness of breath or wheezing.      aspirin (Adult Low Dose Aspirin) 81 mg EC tablet Take 1 tablet (81 mg) by mouth once daily.      atorvastatin (Lipitor) 80 mg tablet Take 1 tablet (80 mg) by mouth once daily. (Patient taking differently: Take 0.5 tablets (40 mg) by mouth once daily.) 90 tablet 3    cholecalciferol (Vitamin D-3) 5,000 Units tablet Take 1 tablet (5,000 Units) by mouth once daily.      co-enzyme Q-10 30 mg capsule Take  1 capsule (30 mg) by mouth once daily.      cyanocobalamin, vitamin B-12, (Vitamin B-12) 5,000 mcg tablet, sublingual Place 1 tablet under the tongue once daily.      fluticasone (Flonase) 50 mcg/actuation nasal spray Administer 2 sprays into each nostril once daily.      glucosamine sulfate 500 mg capsule Take 1 tablet by mouth once daily.      metoprolol succinate XL (Toprol-XL) 25 mg 24 hr tablet Take 1 tablet (25 mg) by mouth once daily. Do not crush or chew. (Patient taking differently: Take 0.5 tablets (12.5 mg) by mouth once daily. Do not crush or chew.) 90 tablet 3    omega 3-dha-epa-fish oil (Fish OiL) 1,000 mg (120 mg-180 mg) capsule Take 1 capsule (1,000 mg) by mouth once daily.      omeprazole (PriLOSEC) 20 mg DR capsule Take 1 capsule (20 mg) by mouth once daily in the morning. Take before meals.      sildenafil (Viagra) 100 mg tablet Take 1 tablet (100 mg) by mouth if needed.      tamsulosin (Flomax) 0.4 mg 24 hr capsule Take 1 capsule (0.4 mg) by mouth 2 times a day.      TURMERIC ORAL Take 1 tablet by mouth once daily.      valACYclovir (Valtrex) 1 gram tablet Take 0.5 tablets (500 mg) by mouth once daily.      vitamin B complex vit C no.4 (SUPER B COMPLEX + C ORAL) Take 1 tablet by mouth once daily.      ZINC ORAL Take 20 mg by mouth once daily.       No current facility-administered medications for this visit.      reports that he has never smoked. He has never been exposed to tobacco smoke. He has never used smokeless tobacco. He reports current alcohol use of about 2.0 standard drinks of alcohol per week. He reports that he does not use drugs.  Allergies:  Patient has no known allergies.    ROS: See HPI  CONSTITUTIONAL: Chills- none. Fever- none. Weight change appropriate for age.  HEENT: Headache- Negative.  Change in vision- none.  Ear pain- none. Nasal congestion- none. Post-nasal drip-none.  Sore throat-none.  CARDIOLOGY: Chest pain- none.  Leg edema-trace.  Murmurs-soft systolic.   "Palpitation- none.  RESPIRATORY: Denies any shortness of breath.  GI: Abdominal pain- none.  Change in bowel habits- none.  Constipation- none.  Diarrhea- none.  Nausea- none.  Vomiting- none.  MUSCULOSKELETAL: Joint pain- none.  Muscle aches- none.  DERMATOLOGY: Rash- none.  NEUROLOGY: Dizziness- none.   Headache- none.  PSYCHIATRY: Denies any depression or anxiety     Vitals:    06/30/25 0938   BP: 108/72   Pulse: 56   Resp: 18   Temp: 37 °C (98.6 °F)   SpO2: 97%   Height: 1.829 m (6')   PainSc: 0-No pain      BMI:Body mass index is 25.35 kg/m².   General Cardiology:  General Appearance: Alert, oriented and in no acute distress.  HEENT: extra ocular movements intact (EOMI), pupils equal,  round, reactive to light and accommodation (PERRLA).  Carotid Upstroke: no bruit, normal.  Jugular Venous Distention (JVD): flat.  Chest: normal.  Lungs: Clear to auscultation,   Heart Sounds: no S3 or S4, normal S1, S2, regular rate.  Murmur, Click, Gallop: soft systolic murmur.  Abdomen: no hepatomegaly, no masses felt, soft.  Extremities: no leg edema.  Peripheral pulses: 2 plus bilateral.  NEUROLOGY Cranial nerves II-XII grossly intact.     Last Labs:  CMP:  Recent Labs     12/10/24  0959      K 4.0      CO2 28   ANIONGAP 9*   BUN 21   CREATININE 0.93   EGFR >90   GLUCOSE 109*   No results for input(s): \"ALBUMIN\", \"ALKPHOS\", \"ALT\", \"AST\", \"BILITOT\" in the last 8760 hours.    No lab exists for component: \"CA\"  CBC:  Recent Labs     12/10/24  0959   WBC 4.8   HGB 13.3*   HCT 40.5*      MCV 94     COAG: No results for input(s): \"INR\", \"DDIMERVTE\" in the last 8760 hours.  HEME/ENDO:No results for input(s): \"FERRITIN\", \"IRONSAT\", \"TSH\", \"HGBA1C\" in the last 8760 hours.   CARDIAC: No results for input(s): \"LDH\", \"CKMB\", \"TROPHS\", \"BNP\" in the last 8760 hours.    No lab exists for component: \"CK\", \"CKMBP\"No results for input(s): \"CHOL\", \"LDLF\", \"LDL\", \"LDLCALC\", \"HDL\", \"TRIG\" in the last 8760 hours.    Last " Cardiology Tests:  Echo:  Echo Results:  No results found for this or any previous visit from the past 3650 days.     Cath:  Stress Test:  Stress Results:  No results found for this or any previous visit from the past 365 days.     Cardiac Imaging:    Problem List Items Addressed This Visit       Coronary atherosclerosis - Primary    Essential hypertension    Mixed hyperlipidemia      64-year-old patient with a history of CAD, PCI of LAD past.  History of hypertension hyperlipidemia.  1.  Coronary disease: Continue current aspirin as well as low-dose of beta-blocker.  2.  Essential hypertension: Continue current beta-blocker blood pressure stable at the moment.  Goal to keep mean blood pressure below 125/75.  3.  Mixed hyperlipidemia: Continue current atorvastatin 40 mg tablet p.o. daily.    Advised patient to avoid lunch meats, canned soups, pizzas, bread rolls, and sandwiches. Advised patient to limit salt intake 1,500 mg daily. Advised patient to exercise 30 mins/3 times a week including treadmill or aerobic type, Goal to achieve 65% target HR.    Diet and exercise reviewed with patient..advice to walk about 10,000 steps or about 2 hours during day time. Cut back on salt, sugar and flour.    Pt. care time is spent includes independent review of diagnostic tests, labs, radiographs, EKGs and coordination of care. Assessment, impression and plans are reflected in the note above as well as the orders.    Roland Figueroa MD  [facility]

## 2025-07-31 ENCOUNTER — HOSPITAL ENCOUNTER (OUTPATIENT)
Facility: HOSPITAL | Age: 65
Setting detail: OBSERVATION
Discharge: HOME | End: 2025-08-01
Attending: STUDENT IN AN ORGANIZED HEALTH CARE EDUCATION/TRAINING PROGRAM | Admitting: STUDENT IN AN ORGANIZED HEALTH CARE EDUCATION/TRAINING PROGRAM
Payer: COMMERCIAL

## 2025-07-31 ENCOUNTER — APPOINTMENT (OUTPATIENT)
Dept: CARDIOLOGY | Facility: HOSPITAL | Age: 65
End: 2025-07-31
Payer: COMMERCIAL

## 2025-07-31 ENCOUNTER — APPOINTMENT (OUTPATIENT)
Dept: RADIOLOGY | Facility: HOSPITAL | Age: 65
End: 2025-07-31
Payer: COMMERCIAL

## 2025-07-31 ENCOUNTER — TELEPHONE (OUTPATIENT)
Dept: CARDIOLOGY | Facility: CLINIC | Age: 65
End: 2025-07-31
Payer: COMMERCIAL

## 2025-07-31 DIAGNOSIS — R00.1 BRADYCARDIA: ICD-10-CM

## 2025-07-31 DIAGNOSIS — I25.118 ATHEROSCLEROSIS OF CORONARY ARTERY OF NATIVE HEART WITH STABLE ANGINA PECTORIS, UNSPECIFIED VESSEL OR LESION TYPE: ICD-10-CM

## 2025-07-31 DIAGNOSIS — E78.2 MIXED HYPERLIPIDEMIA: ICD-10-CM

## 2025-07-31 DIAGNOSIS — I10 ESSENTIAL HYPERTENSION: ICD-10-CM

## 2025-07-31 DIAGNOSIS — R07.89 OTHER CHEST PAIN: ICD-10-CM

## 2025-07-31 DIAGNOSIS — R07.9 CHEST PAIN, UNSPECIFIED TYPE: Primary | ICD-10-CM

## 2025-07-31 LAB
ALBUMIN SERPL BCP-MCNC: 4.5 G/DL (ref 3.4–5)
ALP SERPL-CCNC: 61 U/L (ref 33–136)
ALT SERPL W P-5'-P-CCNC: 21 U/L (ref 10–52)
ANION GAP SERPL CALCULATED.3IONS-SCNC: 9 MMOL/L (ref 10–20)
AST SERPL W P-5'-P-CCNC: 21 U/L (ref 9–39)
BASOPHILS # BLD AUTO: 0.05 X10*3/UL (ref 0–0.1)
BASOPHILS NFR BLD AUTO: 0.9 %
BILIRUB SERPL-MCNC: 1.5 MG/DL (ref 0–1.2)
BNP SERPL-MCNC: 47 PG/ML (ref 0–99)
BUN SERPL-MCNC: 19 MG/DL (ref 6–23)
CALCIUM SERPL-MCNC: 9.5 MG/DL (ref 8.6–10.3)
CARDIAC TROPONIN I PNL SERPL HS: 3 NG/L (ref 0–20)
CARDIAC TROPONIN I PNL SERPL HS: <3 NG/L (ref 0–20)
CHLORIDE SERPL-SCNC: 103 MMOL/L (ref 98–107)
CO2 SERPL-SCNC: 31 MMOL/L (ref 21–32)
CREAT SERPL-MCNC: 0.9 MG/DL (ref 0.5–1.3)
EGFRCR SERPLBLD CKD-EPI 2021: >90 ML/MIN/1.73M*2
EOSINOPHIL # BLD AUTO: 0.25 X10*3/UL (ref 0–0.7)
EOSINOPHIL NFR BLD AUTO: 4.7 %
ERYTHROCYTE [DISTWIDTH] IN BLOOD BY AUTOMATED COUNT: 12.7 % (ref 11.5–14.5)
GLUCOSE SERPL-MCNC: 107 MG/DL (ref 74–99)
HCT VFR BLD AUTO: 41.1 % (ref 41–52)
HGB BLD-MCNC: 13.8 G/DL (ref 13.5–17.5)
IMM GRANULOCYTES # BLD AUTO: 0.01 X10*3/UL (ref 0–0.7)
IMM GRANULOCYTES NFR BLD AUTO: 0.2 % (ref 0–0.9)
LYMPHOCYTES # BLD AUTO: 1.53 X10*3/UL (ref 1.2–4.8)
LYMPHOCYTES NFR BLD AUTO: 29 %
MCH RBC QN AUTO: 31.7 PG (ref 26–34)
MCHC RBC AUTO-ENTMCNC: 33.6 G/DL (ref 32–36)
MCV RBC AUTO: 95 FL (ref 80–100)
MONOCYTES # BLD AUTO: 0.67 X10*3/UL (ref 0.1–1)
MONOCYTES NFR BLD AUTO: 12.7 %
NEUTROPHILS # BLD AUTO: 2.76 X10*3/UL (ref 1.2–7.7)
NEUTROPHILS NFR BLD AUTO: 52.5 %
NRBC BLD-RTO: 0 /100 WBCS (ref 0–0)
PLATELET # BLD AUTO: 194 X10*3/UL (ref 150–450)
POTASSIUM SERPL-SCNC: 3.9 MMOL/L (ref 3.5–5.3)
PROT SERPL-MCNC: 7.4 G/DL (ref 6.4–8.2)
RBC # BLD AUTO: 4.35 X10*6/UL (ref 4.5–5.9)
SODIUM SERPL-SCNC: 139 MMOL/L (ref 136–145)
WBC # BLD AUTO: 5.3 X10*3/UL (ref 4.4–11.3)

## 2025-07-31 PROCEDURE — 93005 ELECTROCARDIOGRAM TRACING: CPT

## 2025-07-31 PROCEDURE — 85025 COMPLETE CBC W/AUTO DIFF WBC: CPT

## 2025-07-31 PROCEDURE — G0378 HOSPITAL OBSERVATION PER HR: HCPCS

## 2025-07-31 PROCEDURE — 84075 ASSAY ALKALINE PHOSPHATASE: CPT

## 2025-07-31 PROCEDURE — 99232 SBSQ HOSP IP/OBS MODERATE 35: CPT | Performed by: NURSE PRACTITIONER

## 2025-07-31 PROCEDURE — 2500000001 HC RX 250 WO HCPCS SELF ADMINISTERED DRUGS (ALT 637 FOR MEDICARE OP)

## 2025-07-31 PROCEDURE — 99223 1ST HOSP IP/OBS HIGH 75: CPT | Performed by: PHYSICIAN ASSISTANT

## 2025-07-31 PROCEDURE — 84484 ASSAY OF TROPONIN QUANT: CPT

## 2025-07-31 PROCEDURE — 99285 EMERGENCY DEPT VISIT HI MDM: CPT | Performed by: STUDENT IN AN ORGANIZED HEALTH CARE EDUCATION/TRAINING PROGRAM

## 2025-07-31 PROCEDURE — 2500000004 HC RX 250 GENERAL PHARMACY W/ HCPCS (ALT 636 FOR OP/ED): Performed by: NURSE PRACTITIONER

## 2025-07-31 PROCEDURE — 36415 COLL VENOUS BLD VENIPUNCTURE: CPT

## 2025-07-31 PROCEDURE — 93010 ELECTROCARDIOGRAM REPORT: CPT | Performed by: INTERNAL MEDICINE

## 2025-07-31 PROCEDURE — 71045 X-RAY EXAM CHEST 1 VIEW: CPT | Mod: FOREIGN READ | Performed by: RADIOLOGY

## 2025-07-31 PROCEDURE — 96372 THER/PROPH/DIAG INJ SC/IM: CPT | Performed by: NURSE PRACTITIONER

## 2025-07-31 PROCEDURE — 71045 X-RAY EXAM CHEST 1 VIEW: CPT

## 2025-07-31 PROCEDURE — 83880 ASSAY OF NATRIURETIC PEPTIDE: CPT

## 2025-07-31 RX ORDER — PROMETHAZINE HYDROCHLORIDE 25 MG/1
25 SUPPOSITORY RECTAL EVERY 12 HOURS PRN
Status: DISCONTINUED | OUTPATIENT
Start: 2025-07-31 | End: 2025-08-01 | Stop reason: HOSPADM

## 2025-07-31 RX ORDER — ENOXAPARIN SODIUM 100 MG/ML
40 INJECTION SUBCUTANEOUS DAILY
Status: DISCONTINUED | OUTPATIENT
Start: 2025-07-31 | End: 2025-08-01 | Stop reason: HOSPADM

## 2025-07-31 RX ORDER — BISACODYL 5 MG
10 TABLET, DELAYED RELEASE (ENTERIC COATED) ORAL DAILY PRN
Status: DISCONTINUED | OUTPATIENT
Start: 2025-07-31 | End: 2025-08-01 | Stop reason: HOSPADM

## 2025-07-31 RX ORDER — BISACODYL 10 MG/1
10 SUPPOSITORY RECTAL DAILY PRN
Status: DISCONTINUED | OUTPATIENT
Start: 2025-07-31 | End: 2025-08-01 | Stop reason: HOSPADM

## 2025-07-31 RX ORDER — ONDANSETRON 4 MG/1
4 TABLET, FILM COATED ORAL EVERY 8 HOURS PRN
Status: DISCONTINUED | OUTPATIENT
Start: 2025-07-31 | End: 2025-08-01 | Stop reason: HOSPADM

## 2025-07-31 RX ORDER — ONDANSETRON HYDROCHLORIDE 2 MG/ML
4 INJECTION, SOLUTION INTRAVENOUS EVERY 8 HOURS PRN
Status: DISCONTINUED | OUTPATIENT
Start: 2025-07-31 | End: 2025-08-01 | Stop reason: HOSPADM

## 2025-07-31 RX ORDER — POLYETHYLENE GLYCOL 3350 17 G/17G
17 POWDER, FOR SOLUTION ORAL DAILY PRN
Status: DISCONTINUED | OUTPATIENT
Start: 2025-07-31 | End: 2025-08-01 | Stop reason: HOSPADM

## 2025-07-31 RX ORDER — PROMETHAZINE HYDROCHLORIDE 25 MG/1
25 TABLET ORAL EVERY 6 HOURS PRN
Status: DISCONTINUED | OUTPATIENT
Start: 2025-07-31 | End: 2025-08-01 | Stop reason: HOSPADM

## 2025-07-31 RX ORDER — DOCUSATE SODIUM 100 MG/1
100 CAPSULE, LIQUID FILLED ORAL 2 TIMES DAILY
Status: DISCONTINUED | OUTPATIENT
Start: 2025-07-31 | End: 2025-08-01 | Stop reason: HOSPADM

## 2025-07-31 RX ORDER — NAPROXEN SODIUM 220 MG/1
324 TABLET, FILM COATED ORAL ONCE
Status: COMPLETED | OUTPATIENT
Start: 2025-07-31 | End: 2025-07-31

## 2025-07-31 RX ADMIN — ASPIRIN 324 MG: 81 TABLET, CHEWABLE ORAL at 12:43

## 2025-07-31 RX ADMIN — ENOXAPARIN SODIUM 40 MG: 100 INJECTION SUBCUTANEOUS at 16:41

## 2025-07-31 SDOH — HEALTH STABILITY: MENTAL HEALTH: HOW OFTEN DO YOU HAVE SIX OR MORE DRINKS ON ONE OCCASION?: NEVER

## 2025-07-31 SDOH — ECONOMIC STABILITY: HOUSING INSECURITY: AT ANY TIME IN THE PAST 12 MONTHS, WERE YOU HOMELESS OR LIVING IN A SHELTER (INCLUDING NOW)?: NO

## 2025-07-31 SDOH — SOCIAL STABILITY: SOCIAL NETWORK
DO YOU BELONG TO ANY CLUBS OR ORGANIZATIONS SUCH AS CHURCH GROUPS, UNIONS, FRATERNAL OR ATHLETIC GROUPS, OR SCHOOL GROUPS?: NO

## 2025-07-31 SDOH — HEALTH STABILITY: PHYSICAL HEALTH: ON AVERAGE, HOW MANY MINUTES DO YOU ENGAGE IN EXERCISE AT THIS LEVEL?: 0 MIN

## 2025-07-31 SDOH — SOCIAL STABILITY: SOCIAL INSECURITY: HAVE YOU HAD ANY THOUGHTS OF HARMING ANYONE ELSE?: NO

## 2025-07-31 SDOH — HEALTH STABILITY: MENTAL HEALTH
DO YOU FEEL STRESS - TENSE, RESTLESS, NERVOUS, OR ANXIOUS, OR UNABLE TO SLEEP AT NIGHT BECAUSE YOUR MIND IS TROUBLED ALL THE TIME - THESE DAYS?: NOT AT ALL

## 2025-07-31 SDOH — HEALTH STABILITY: PHYSICAL HEALTH: ON AVERAGE, HOW MANY DAYS PER WEEK DO YOU ENGAGE IN MODERATE TO STRENUOUS EXERCISE (LIKE A BRISK WALK)?: 0 DAYS

## 2025-07-31 SDOH — SOCIAL STABILITY: SOCIAL INSECURITY
WITHIN THE LAST YEAR, HAVE YOU BEEN KICKED, HIT, SLAPPED, OR OTHERWISE PHYSICALLY HURT BY YOUR PARTNER OR EX-PARTNER?: NO

## 2025-07-31 SDOH — SOCIAL STABILITY: SOCIAL INSECURITY: ARE YOU MARRIED, WIDOWED, DIVORCED, SEPARATED, NEVER MARRIED, OR LIVING WITH A PARTNER?: MARRIED

## 2025-07-31 SDOH — ECONOMIC STABILITY: FOOD INSECURITY: HOW HARD IS IT FOR YOU TO PAY FOR THE VERY BASICS LIKE FOOD, HOUSING, MEDICAL CARE, AND HEATING?: NOT HARD AT ALL

## 2025-07-31 SDOH — ECONOMIC STABILITY: FOOD INSECURITY: WITHIN THE PAST 12 MONTHS, THE FOOD YOU BOUGHT JUST DIDN'T LAST AND YOU DIDN'T HAVE MONEY TO GET MORE.: NEVER TRUE

## 2025-07-31 SDOH — ECONOMIC STABILITY: INCOME INSECURITY: IN THE PAST 12 MONTHS HAS THE ELECTRIC, GAS, OIL, OR WATER COMPANY THREATENED TO SHUT OFF SERVICES IN YOUR HOME?: NO

## 2025-07-31 SDOH — ECONOMIC STABILITY: HOUSING INSECURITY: IN THE PAST 12 MONTHS, HOW MANY TIMES HAVE YOU MOVED WHERE YOU WERE LIVING?: 0

## 2025-07-31 SDOH — HEALTH STABILITY: MENTAL HEALTH: HOW MANY DRINKS CONTAINING ALCOHOL DO YOU HAVE ON A TYPICAL DAY WHEN YOU ARE DRINKING?: 1 OR 2

## 2025-07-31 SDOH — ECONOMIC STABILITY: FOOD INSECURITY: WITHIN THE PAST 12 MONTHS, YOU WORRIED THAT YOUR FOOD WOULD RUN OUT BEFORE YOU GOT THE MONEY TO BUY MORE.: NEVER TRUE

## 2025-07-31 SDOH — HEALTH STABILITY: PHYSICAL HEALTH
HOW OFTEN DO YOU NEED TO HAVE SOMEONE HELP YOU WHEN YOU READ INSTRUCTIONS, PAMPHLETS, OR OTHER WRITTEN MATERIAL FROM YOUR DOCTOR OR PHARMACY?: NEVER

## 2025-07-31 SDOH — SOCIAL STABILITY: SOCIAL INSECURITY: WITHIN THE LAST YEAR, HAVE YOU BEEN AFRAID OF YOUR PARTNER OR EX-PARTNER?: NO

## 2025-07-31 SDOH — SOCIAL STABILITY: SOCIAL INSECURITY: WITHIN THE LAST YEAR, HAVE YOU BEEN HUMILIATED OR EMOTIONALLY ABUSED IN OTHER WAYS BY YOUR PARTNER OR EX-PARTNER?: NO

## 2025-07-31 SDOH — HEALTH STABILITY: MENTAL HEALTH: HOW OFTEN DO YOU HAVE A DRINK CONTAINING ALCOHOL?: 4 OR MORE TIMES A WEEK

## 2025-07-31 SDOH — SOCIAL STABILITY: SOCIAL NETWORK
IN A TYPICAL WEEK, HOW MANY TIMES DO YOU TALK ON THE PHONE WITH FAMILY, FRIENDS, OR NEIGHBORS?: MORE THAN THREE TIMES A WEEK

## 2025-07-31 SDOH — SOCIAL STABILITY: SOCIAL INSECURITY
WITHIN THE LAST YEAR, HAVE YOU BEEN RAPED OR FORCED TO HAVE ANY KIND OF SEXUAL ACTIVITY BY YOUR PARTNER OR EX-PARTNER?: NO

## 2025-07-31 SDOH — ECONOMIC STABILITY: TRANSPORTATION INSECURITY: IN THE PAST 12 MONTHS, HAS LACK OF TRANSPORTATION KEPT YOU FROM MEDICAL APPOINTMENTS OR FROM GETTING MEDICATIONS?: NO

## 2025-07-31 SDOH — SOCIAL STABILITY: SOCIAL INSECURITY: WERE YOU ABLE TO COMPLETE ALL THE BEHAVIORAL HEALTH SCREENINGS?: YES

## 2025-07-31 SDOH — ECONOMIC STABILITY: HOUSING INSECURITY: IN THE LAST 12 MONTHS, WAS THERE A TIME WHEN YOU WERE NOT ABLE TO PAY THE MORTGAGE OR RENT ON TIME?: NO

## 2025-07-31 SDOH — SOCIAL STABILITY: SOCIAL INSECURITY: ARE YOU OR HAVE YOU BEEN THREATENED OR ABUSED PHYSICALLY, EMOTIONALLY, OR SEXUALLY BY ANYONE?: NO

## 2025-07-31 SDOH — SOCIAL STABILITY: SOCIAL INSECURITY: DOES ANYONE TRY TO KEEP YOU FROM HAVING/CONTACTING OTHER FRIENDS OR DOING THINGS OUTSIDE YOUR HOME?: NO

## 2025-07-31 SDOH — SOCIAL STABILITY: SOCIAL NETWORK: HOW OFTEN DO YOU ATTEND MEETINGS OF THE CLUBS OR ORGANIZATIONS YOU BELONG TO?: NEVER

## 2025-07-31 SDOH — SOCIAL STABILITY: SOCIAL NETWORK: HOW OFTEN DO YOU GET TOGETHER WITH FRIENDS OR RELATIVES?: MORE THAN THREE TIMES A WEEK

## 2025-07-31 SDOH — SOCIAL STABILITY: SOCIAL NETWORK: HOW OFTEN DO YOU ATTEND CHURCH OR RELIGIOUS SERVICES?: NEVER

## 2025-07-31 SDOH — SOCIAL STABILITY: SOCIAL INSECURITY: ARE THERE ANY APPARENT SIGNS OF INJURIES/BEHAVIORS THAT COULD BE RELATED TO ABUSE/NEGLECT?: NO

## 2025-07-31 SDOH — SOCIAL STABILITY: SOCIAL INSECURITY: DO YOU FEEL ANYONE HAS EXPLOITED OR TAKEN ADVANTAGE OF YOU FINANCIALLY OR OF YOUR PERSONAL PROPERTY?: NO

## 2025-07-31 SDOH — SOCIAL STABILITY: SOCIAL INSECURITY: DO YOU FEEL UNSAFE GOING BACK TO THE PLACE WHERE YOU ARE LIVING?: NO

## 2025-07-31 SDOH — SOCIAL STABILITY: SOCIAL INSECURITY: HAVE YOU HAD THOUGHTS OF HARMING ANYONE ELSE?: NO

## 2025-07-31 SDOH — SOCIAL STABILITY: SOCIAL INSECURITY: HAS ANYONE EVER THREATENED TO HURT YOUR FAMILY OR YOUR PETS?: NO

## 2025-07-31 SDOH — SOCIAL STABILITY: SOCIAL INSECURITY: ABUSE: ADULT

## 2025-07-31 ASSESSMENT — LIFESTYLE VARIABLES
AUDIT-C TOTAL SCORE: 4
HOW MANY STANDARD DRINKS CONTAINING ALCOHOL DO YOU HAVE ON A TYPICAL DAY: 1 OR 2
HOW OFTEN DO YOU HAVE 6 OR MORE DRINKS ON ONE OCCASION: NEVER
SKIP TO QUESTIONS 9-10: 1
SKIP TO QUESTIONS 9-10: 1
SUBSTANCE_ABUSE_PAST_12_MONTHS: NO
AUDIT-C TOTAL SCORE: 1
HOW OFTEN DO YOU HAVE A DRINK CONTAINING ALCOHOL: MONTHLY OR LESS
AUDIT-C TOTAL SCORE: 1

## 2025-07-31 ASSESSMENT — ENCOUNTER SYMPTOMS
APPETITE CHANGE: 0
WEAKNESS: 0
EYES NEGATIVE: 1
ABDOMINAL PAIN: 0
NUMBNESS: 0
SYNCOPE: 0
WHEEZING: 0
PSYCHIATRIC NEGATIVE: 1
FATIGUE: 0
BACK PAIN: 0
FACIAL ASYMMETRY: 0
VOMITING: 0
DIZZINESS: 1
DIFFICULTY URINATING: 0
CONSTIPATION: 0
NAUSEA: 0
ACTIVITY CHANGE: 0
SEIZURES: 0
ALLERGIC/IMMUNOLOGIC NEGATIVE: 1
SORE THROAT: 0
NECK STIFFNESS: 0
ORTHOPNEA: 0
COUGH: 0
NECK PAIN: 0
SHORTNESS OF BREATH: 0
VOICE CHANGE: 0
PND: 0
ABDOMINAL DISTENTION: 0
IRREGULAR HEARTBEAT: 0
DIARRHEA: 0
DYSPNEA ON EXERTION: 0
LIGHT-HEADEDNESS: 1
LIGHT-HEADEDNESS: 0
SPEECH DIFFICULTY: 0
CHILLS: 0
PALPITATIONS: 0
ENDOCRINE NEGATIVE: 1
MUSCULOSKELETAL NEGATIVE: 1
NEAR-SYNCOPE: 0
HEMATOLOGIC/LYMPHATIC NEGATIVE: 1
ALTERED MENTAL STATUS: 0

## 2025-07-31 ASSESSMENT — PAIN SCALES - GENERAL
PAINLEVEL_OUTOF10: 6
PAINLEVEL_OUTOF10: 0 - NO PAIN

## 2025-07-31 ASSESSMENT — COGNITIVE AND FUNCTIONAL STATUS - GENERAL
PATIENT BASELINE BEDBOUND: NO
DAILY ACTIVITIY SCORE: 24
DAILY ACTIVITIY SCORE: 24
MOBILITY SCORE: 24
MOBILITY SCORE: 24

## 2025-07-31 ASSESSMENT — HEART SCORE
HISTORY: MODERATELY SUSPICIOUS
HEART SCORE: 4
HISTORY: MODERATELY SUSPICIOUS
TROPONIN: LESS THAN OR EQUAL TO NORMAL LIMIT
AGE: 45-64
ECG: NORMAL
RISK FACTORS: >2 RISK FACTORS OR HX OF ATHEROSCLEROTIC DISEASE
TROPONIN: LESS THAN OR EQUAL TO NORMAL LIMIT
AGE: 45-64
RISK FACTORS: >2 RISK FACTORS OR HX OF ATHEROSCLEROTIC DISEASE
HEART SCORE: 4
ECG: NORMAL

## 2025-07-31 ASSESSMENT — ACTIVITIES OF DAILY LIVING (ADL)
TOILETING: INDEPENDENT
JUDGMENT_ADEQUATE_SAFELY_COMPLETE_DAILY_ACTIVITIES: YES
HEARING - RIGHT EAR: FUNCTIONAL
LACK_OF_TRANSPORTATION: NO
LACK_OF_TRANSPORTATION: NO
FEEDING YOURSELF: INDEPENDENT
WALKS IN HOME: INDEPENDENT
ASSISTIVE_DEVICE: EYEGLASSES
ADEQUATE_TO_COMPLETE_ADL: YES
GROOMING: INDEPENDENT
PATIENT'S MEMORY ADEQUATE TO SAFELY COMPLETE DAILY ACTIVITIES?: YES
HEARING - LEFT EAR: FUNCTIONAL
DRESSING YOURSELF: INDEPENDENT
BATHING: INDEPENDENT

## 2025-07-31 ASSESSMENT — PATIENT HEALTH QUESTIONNAIRE - PHQ9
2. FEELING DOWN, DEPRESSED OR HOPELESS: NOT AT ALL
SUM OF ALL RESPONSES TO PHQ9 QUESTIONS 1 & 2: 0
1. LITTLE INTEREST OR PLEASURE IN DOING THINGS: NOT AT ALL

## 2025-07-31 ASSESSMENT — PAIN DESCRIPTION - PAIN TYPE: TYPE: ACUTE PAIN

## 2025-07-31 ASSESSMENT — PAIN - FUNCTIONAL ASSESSMENT: PAIN_FUNCTIONAL_ASSESSMENT: 0-10

## 2025-07-31 ASSESSMENT — PAIN DESCRIPTION - ORIENTATION: ORIENTATION: LEFT

## 2025-07-31 ASSESSMENT — PAIN DESCRIPTION - LOCATION: LOCATION: CHEST

## 2025-07-31 ASSESSMENT — PAIN DESCRIPTION - DESCRIPTORS: DESCRIPTORS: SHARP

## 2025-07-31 NOTE — ED PROVIDER NOTES
HPI   Chief Complaint   Patient presents with    Chest Pain       HPI  64-year-old male with history of CAD with stent placement in 2005 presents for evaluation of chest pain.  Reports the chest pain has been intermittent over the past week but got worse yesterday.  He states that it is a constant dull aching pain but sometimes when he gets up and moves or takes a deep breath it intensifies and becomes a sharp pain.  It is in the left side of his chest and does not radiate.  Reports that he has been taking his baby aspirin as prescribed but has not taken anything else for pain relief.  States he tried to call his cardiologist Dr. Figueroa but their phone lines were not working.  He denies any shortness of breath or lower extremity swelling.  He reports he did have a brief episode of lightheadedness a few days ago but otherwise denies lightheadedness or dizziness since.  No headache or vision changes.  No abdominal pain.  No recent travel surgery or hospitalization.  States his last cath was 3 years ago.      Patient History   Medical History[1]  Surgical History[2]  Family History[3]  Social History[4]    Physical Exam   ED Triage Vitals [07/31/25 1131]   Temperature Heart Rate Respirations BP   36.7 °C (98.1 °F) 72 16 170/89      Pulse Ox Temp Source Heart Rate Source Patient Position   99 % Temporal Monitor --      BP Location FiO2 (%)     -- --       Physical Exam  Vitals and nursing note reviewed.   Constitutional:       General: He is not in acute distress.     Appearance: He is well-developed.      Comments: Well-appearing 64-year-old male resting in hospital bed   HENT:      Head: Normocephalic and atraumatic.     Eyes:      Conjunctiva/sclera: Conjunctivae normal.       Cardiovascular:      Rate and Rhythm: Normal rate and regular rhythm.      Heart sounds: No murmur heard.  Pulmonary:      Effort: Pulmonary effort is normal. No respiratory distress.      Breath sounds: Normal breath sounds.      Musculoskeletal:         General: No swelling.      Cervical back: Neck supple.      Comments: No pretibial edema     Skin:     General: Skin is warm and dry.      Capillary Refill: Capillary refill takes less than 2 seconds.     Neurological:      Mental Status: He is alert.     Psychiatric:         Mood and Affect: Mood normal.           ED Course & MDM   ED Course as of 07/31/25 2145   Thu Jul 31, 2025   1144 EKG interpreted by me: Normal sinus rhythm, rate 65.  Normal axis.  No significant ST or T wave abnormalities. [ML]      ED Course User Index  [ML] Luis Garcia MD         Diagnoses as of 07/31/25 2145   Chest pain, unspecified type                 No data recorded     [place] Coma Scale Score: 15 (07/31/25 1600 : Stephanie Naranjo RN) HEART Score: 4 (07/31/25 1309 : Maira E Chowdary PA-C)                         Medical Decision Making  Parts of this chart have been completed using voice recognition software. Please excuse any errors of transcription.  My thought process and reason for plan has been formulated from the time that I saw the patient until the time of disposition and is not specific to one specific moment during their visit and furthermore my MDM encompasses this entire chart and not only this text box.      HPI: Detailed above.    Exam: A medically appropriate exam performed, outlined above, given the known history and presentation.    History obtained from: Patient, chart review    EKG: Interpreted by attending physician and reviewed by me    Medications given during visit:  Medications   promethazine (Phenergan) tablet 25 mg (has no administration in time range)     Or   promethazine (Phenergan) suppository 25 mg (has no administration in time range)   polyethylene glycol (Glycolax, Miralax) packet 17 g (has no administration in time range)   docusate sodium (Colace) capsule 100 mg (100 mg oral Not Given 7/31/25 2127)   bisacodyl (Dulcolax) EC tablet 10 mg (has no administration in time  range)   bisacodyl (Dulcolax) suppository 10 mg (has no administration in time range)   enoxaparin (Lovenox) syringe 40 mg (40 mg subcutaneous Given 7/31/25 1641)   ondansetron (Zofran) tablet 4 mg (has no administration in time range)     Or   ondansetron (Zofran) injection 4 mg (has no administration in time range)   aspirin chewable tablet 324 mg (324 mg oral Given 7/31/25 1243)        Diagnostic/tests  Labs Reviewed   CBC WITH AUTO DIFFERENTIAL - Abnormal       Result Value    WBC 5.3      nRBC 0.0      RBC 4.35 (*)     Hemoglobin 13.8      Hematocrit 41.1      MCV 95      MCH 31.7      MCHC 33.6      RDW 12.7      Platelets 194      Neutrophils % 52.5      Immature Granulocytes %, Automated 0.2      Lymphocytes % 29.0      Monocytes % 12.7      Eosinophils % 4.7      Basophils % 0.9      Neutrophils Absolute 2.76      Immature Granulocytes Absolute, Automated 0.01      Lymphocytes Absolute 1.53      Monocytes Absolute 0.67      Eosinophils Absolute 0.25      Basophils Absolute 0.05     COMPREHENSIVE METABOLIC PANEL - Abnormal    Glucose 107 (*)     Sodium 139      Potassium 3.9      Chloride 103      Bicarbonate 31      Anion Gap 9 (*)     Urea Nitrogen 19      Creatinine 0.90      eGFR >90      Calcium 9.5      Albumin 4.5      Alkaline Phosphatase 61      Total Protein 7.4      AST 21      Bilirubin, Total 1.5 (*)     ALT 21     B-TYPE NATRIURETIC PEPTIDE - Normal    BNP 47      Narrative:        <100 pg/mL - Heart failure unlikely  100-299 pg/mL - Intermediate probability of acute heart                  failure exacerbation. Correlate with clinical                  context and patient history.    >=300 pg/mL - Heart Failure likely. Correlate with clinical                  context and patient history.    BNP testing is performed using different testing methodology at [facility] than at other [facility] hospitals. Direct result comparisons should only be made within the same method.      SERIAL  TROPONIN-INITIAL - Normal    Troponin I, High Sensitivity 3      Narrative:     Less than 99th percentile of normal range cutoff-  Female and children under 18 years old <14 ng/L; Male <21 ng/L: Negative  Repeat testing should be performed if clinically indicated.     Female and children under 18 years old 14-50 ng/L; Male 21-50 ng/L:  Consistent with possible cardiac damage and possible increased clinical   risk. Serial measurements may help to assess extent of myocardial damage.     >50 ng/L: Consistent with cardiac damage, increased clinical risk and  myocardial infarction. Serial measurements may help assess extent of   myocardial damage.      NOTE: Children less than 1 year old may have higher baseline troponin   levels and results should be interpreted in conjunction with the overall   clinical context.     NOTE: Troponin I testing is performed using a different   testing methodology at [facility] than at other   [facility]. Direct result comparisons should only   be made within the same method.   SERIAL TROPONIN, 1 HOUR - Normal    Troponin I, High Sensitivity <3      Narrative:     Less than 99th percentile of normal range cutoff-  Female and children under 18 years old <14 ng/L; Male <21 ng/L: Negative  Repeat testing should be performed if clinically indicated.     Female and children under 18 years old 14-50 ng/L; Male 21-50 ng/L:  Consistent with possible cardiac damage and possible increased clinical   risk. Serial measurements may help to assess extent of myocardial damage.     >50 ng/L: Consistent with cardiac damage, increased clinical risk and  myocardial infarction. Serial measurements may help assess extent of   myocardial damage.      NOTE: Children less than 1 year old may have higher baseline troponin   levels and results should be interpreted in conjunction with the overall   clinical context.     NOTE: Troponin I testing is performed using a different   testing methodology  at [facility] than at other   [facility] hospitals. Direct result comparisons should only   be made within the same method.   TROPONIN SERIES- (INITIAL, 1 HR)    Narrative:     The following orders were created for panel order Troponin I Series, High Sensitivity (0, 1 HR).  Procedure                               Abnormality         Status                     ---------                               -----------         ------                     Troponin I, High Sensiti...[[id number]]  Normal              Final result               Troponin, High Sensitivi...[[id number]]  Normal              Final result                 Please view results for these tests on the individual orders.   CBC   BASIC METABOLIC PANEL      XR chest 1 view   Final Result   Normal heart size with no radiographic signs of active pulmonary   parenchymal infiltration.   Signed by Khloe Carlson DO      Transthoracic Echo Complete    (Results Pending)        Considerations/further MDM:  Patient is awake alert clinically nontoxic-appearing and hemodynamically stable.  He is provided aspirin upon arrival.  His laboratory workup is unremarkable.  EKG is without acute ischemia and cardiac enzymes are negative upon repeat.  Despite negative cardiac workup, in the setting of pain that is worse with exertion and improves with rest and gradually escalating, I do have suspicion for angina despite negative troponins.  Provided patient's risk factors and heart score of 4, the patient was accepted to medicine service for further observation and management of his symptoms.  Did consider PE, pneumothorax, aortic dissection although these etiologies are felt to be less likely based on history exam and presentation.  Patient educated on all findings and agreeable with plan of care.      Procedure  Procedures       [1]   Past Medical History:  Diagnosis Date    Angina pectoris 11/24/2023    Asthma     Atherosclerotic heart disease of native coronary artery  with other forms of angina pectoris 11/24/2023    Coronary atherosclerosis 02/27/2007    Formatting of this note might be different from the original. MI  Date: 1/05 PCI  Date: 1/05 - stent to his LAD Last Assessment & Plan: Formatting of this note might be different from the original. Assessment: States is well controlled with medication and denies any recent exacerbations Follows with PCP and cardiology History of MI with single stent placed in 2005 Treated with ASA 81 mg Denies any    Essential hypertension 11/24/2023    GERD (gastroesophageal reflux disease)     Heart disease 11/24/2023    Hiatal hernia     Mixed hyperlipidemia 11/24/2023    Myocardial infarction (Multi)     Unspecified blepharitis right eye, unspecified eyelid 10/08/2019    Blepharitis of both eyes   [2]   Past Surgical History:  Procedure Laterality Date    APPENDECTOMY      CARDIAC CATHETERIZATION      CARDIAC SURGERY      CORONARY STENT PLACEMENT      HERNIA REPAIR      MR ATHROGRAM SHOULDER LEFT W FL GUIDED INJECTION Left 04/03/2015    MR SHOULDER ARTHROGRAM LEFT W FL GUIDED INJECTION [facility]    ROTATOR CUFF REPAIR Bilateral    [3]   Family History  Problem Relation Name Age of Onset    Heart disease Father     [4]   Social History  Tobacco Use    Smoking status: Never     Passive exposure: Never    Smokeless tobacco: Never   Vaping Use    Vaping status: Never Used   Substance Use Topics    Alcohol use: Yes     Alcohol/week: 2.0 standard drinks of alcohol     Types: 2 Standard drinks or equivalent per week    Drug use: Never        Maria E Chowdary PA-C  07/31/25 2466

## 2025-07-31 NOTE — ED TRIAGE NOTES
Pt c.o CP on and off for 1 week. Hx of MI with Stent (LAD) 20 years ago. CP is 6/10. No other symptoms.

## 2025-07-31 NOTE — ED PROVIDER NOTES
Supervisory note:  Patient seen in conjunction with ELAINE Bagley.  Patient presents with chest pain.  He reports that it first started about a week ago.  At that time, it was only present when he would move.  Over the last day, the pain became constant at a low level but gets significantly worse when he moves.  When he rests, the pain resolves.  He does have history of heart attack 20 years ago but reports that the pain at that time was different.  On examination, heart and lung sounds are unremarkable.  No significant pretibial edema.  2+ radial pulses bilaterally.  2+ dorsalis pedis pulses bilaterally.    Insetting of pain that is worse with exertion and improves with rest and is gradually escalating, I do have some suspicion for angina despite negative troponins.  Patient was accepted to medicine service for further management.  Patient received aspirin.  PE, aortic dissection, esophageal perforation, pneumothorax felt to be very unlikely.    I personally saw the patient and made/approved the management plan and take responsiblity for the patient management.  Parts of this chart were completed with dictation software, please excuse any errors in transcription.     Luis Garcia MD  07/31/25 8880

## 2025-07-31 NOTE — CONSULTS
Inpatient consult to Cardiology  Consult performed by: Phoebe Betacnourt PA-C  Consult ordered by: CLARISA Abarca-CNP  Reason for consult: chest pain        History Of Present Illness:    Luis Griffith is a 64 y.o. male presenting with 1 week history of intermittent left sternal chest pain that has been worse over the last 2 days, and intermittent lightheadedness/dizziness for the past few years.  He has a past medical history of coronary artery disease s/p PCI x 2 to LAD in 2005, and left complete rotator cuff tear requiring multiple surgical repairs, most recent 12/23/24.  He reports his chest pain is sharp and present with movement, and deep inspiration.  Patient states he has been seeing his cardiologist due to the episodes of lightheadedness/dizziness, usually associated with positional changes, standing up.  He denies near-syncope, or syncope.  He reports being taken off metoprolol succinate 25 mg at one point, but restarting it approximately 1 year ago.  His dose was reduced in half in December, after he was advised his blood pressure and heart rate were low, during preadmission surgical testing.  He also complains of increased left shoulder pain at night, which his orthopedic surgeon is aware of and he has a follow-up appointment already scheduled.  He arrived initially hypertensive to the ER, but his BP reduced without intervention to 115/69.  Labs largely unremarkable, normal electrolytes, creatinine 0.9, BNP 47, troponins negative and flat 3, <3, normal hemoglobin and hematocrit.  Chest x-ray is unremarkable.  Telemetry reviewed showing predominantly sinus bradycardia with rates as low as 48, but predominantly in the 50s.  Patient denies chest pain at this time, shortness of breath, history of lower extremity edema, numbness, tingling, lightheadedness, dizziness, new injuries, nausea, or vomiting.    Review of Systems   Constitutional: Negative for chills.   HENT:  Negative for congestion.     Eyes:  Negative for visual disturbance.   Cardiovascular:  Positive for chest pain. Negative for dyspnea on exertion, irregular heartbeat, leg swelling, near-syncope, orthopnea, palpitations, paroxysmal nocturnal dyspnea and syncope.   Respiratory:  Negative for cough and shortness of breath.    Gastrointestinal:  Negative for abdominal pain.   Neurological:  Positive for light-headedness.   Psychiatric/Behavioral:  Negative for altered mental status.           Last Recorded Vitals:  Vitals:    07/31/25 1131 07/31/25 1245   BP: 170/89 115/69   BP Location:  Left arm   Patient Position:  Sitting   Pulse: 72 58   Resp: 16 12   Temp: 36.7 °C (98.1 °F)    TempSrc: Temporal    SpO2: 99% 97%   Weight: 83.9 kg (185 lb)    Height: 1.829 m (6')        Last Labs:  CBC - 7/31/2025: 11:44 AM  5.3 13.8 194    41.1      CMP - 7/31/2025: 11:44 AM  9.5 7.4 21 --- 1.5   _ 4.5 21 61      PTT - No results in last year.  _   _ _     Troponin I, High Sensitivity   Date/Time Value Ref Range Status   07/31/2025 12:34 PM <3 0 - 20 ng/L Final   07/31/2025 11:44 AM 3 0 - 20 ng/L Final     BNP   Date/Time Value Ref Range Status   07/31/2025 11:44 AM 47 0 - 99 pg/mL Final     Hemoglobin A1C   Date/Time Value Ref Range Status   03/08/2023 10:22 AM 5.1 4.3 - 5.6 % Final     Comment:     American Diabetes Association guidelines indicate that patients with HgbA1c in the range 5.7-6.4% are at increased risk for development of diabetes, and intervention by lifestyle modification may be beneficial. HgbA1c greater or equal to 6.5% is considered diagnostic of diabetes.     LDL Calculated   Date/Time Value Ref Range Status   08/03/2019 10:04 AM 92 65 - 130 MG/DL Final      Last I/O:  No intake/output data recorded.    Past Cardiology Tests (Last 3 Years):  EKG:  No results found for this or any previous visit (from the past 4464 hours).  Interpretation by me normal sinus rhythm, ST changes in anteroseptal leads as seen on prior EKG 12/10/2024, and T  "wave inversion in V1, rate 65    Echo:  No results found for this or any previous visit from the past 1095 days.    Ejection Fractions:  No results found for: \"EF\"  Cath:  No results found for this or any previous visit from the past 1095 days.    Stress Test:  No results found for this or any previous visit from the past 1095 days.    Cardiac Imaging:  No results found for this or any previous visit from the past 1095 days.      Past Medical History:  He has a past medical history of Angina pectoris (11/24/2023), Asthma, Atherosclerotic heart disease of native coronary artery with other forms of angina pectoris (11/24/2023), Coronary atherosclerosis (02/27/2007), Essential hypertension (11/24/2023), GERD (gastroesophageal reflux disease), Heart disease (11/24/2023), Hiatal hernia, Mixed hyperlipidemia (11/24/2023), Myocardial infarction (Multi), and Unspecified blepharitis right eye, unspecified eyelid (10/08/2019).    Past Surgical History:  He has a past surgical history that includes MR athrogram shoulder left w FL guided injection (Left, 04/03/2015); Rotator cuff repair (Bilateral); Hernia repair; Appendectomy; Coronary stent placement; Cardiac surgery; and Cardiac catheterization.      Social History:  He reports that he has never smoked. He has never been exposed to tobacco smoke. He has never used smokeless tobacco. He reports current alcohol use of about 2.0 standard drinks of alcohol per week. He reports that he does not use drugs.    Family History:  Family History[1]     Allergies:  Patient has no known allergies.    Inpatient Medications:  Scheduled Medications[2]  PRN Medications[3]  Continuous Medications[4]  Outpatient Medications:  Current Outpatient Medications   Medication Instructions    aspirin (ADULT LOW DOSE ASPIRIN) 81 mg, Daily    atorvastatin (LIPITOR) 80 mg, oral, Daily    cholecalciferol (VITAMIN D-3) 5,000 Units, Daily    co-enzyme Q-10 30 mg, Daily    cyanocobalamin, vitamin B-12, " (Vitamin B-12) 5,000 mcg tablet, sublingual 1 tablet, Daily    glucosamine sulfate 500 mg capsule 1 tablet, Daily    metoprolol succinate XL (TOPROL-XL) 25 mg, oral, Daily, Do not crush or chew.    omega 3-dha-epa-fish oil (Fish OiL) 1,000 mg (120 mg-180 mg) capsule 1 capsule, Daily    omeprazole (PRILOSEC) 20 mg, Daily before breakfast    tamsulosin (FLOMAX) 0.4 mg, 2 times daily    valACYclovir (VALTREX) 500 mg, Daily    ZINC ORAL 20 mg, Daily       Physical Exam:  Appearance: Alert, oriented, cooperative, in no acute distress.     Eyes: Conjunctiva pink with no redness or exudates. Eyelids without lesions. No scleral icterus.     ENT: Hearing grossly intact. External inspection of ears without lesions or erythema. no nasal flaring. no tripoding, no drooling, no difficulty swallowing oral secretions.    Pulmonary: Clear bilaterally with good chest wall excursion. No rales, rhonchi or wheezing. No accessory muscle use or stridor. No difficulty completing a full sentence without taking a breath    Cardiac: regular rhythm, bradycardia, 54, no rub, gallop. No JVD.    Musculoskeletal: No cyanosis, clubbing.  No lower extremity edema.  Capillary refill less than 2 seconds in lower extremities    Neurological: no focal findings identified.    Psychiatric: Appropriate mood and affect.       Assessment/Plan     Atypical chest pain-reviewed-EKG no acute ischemic changes, troponin and BNP negative, chest x-ray no acute cardiopulmonary process.  Telemetry currently showing sinus bradycardia w rates as low as 48, predominantly in the 50s  Coronary artery disease-continue high dose atorvastatin, aspirin, diagnostic cath 11/2021 patent stents to LAD, placed in 2005, lipid panel reviewed within normal limits  Bradycardia-hold BB, review of outpatient visits show patient is bradycardic at baseline in low 50's  Hypertension-currently controlled without medication, 115/69  Intermittent dizziness/lightheadedness-orthostatic blood  pressures ordered, continue to monitor telemetry and vital signs, echocardiogram    Assessment and plan:    7/31: As above.  Patient is a 64 y.o. male presenting with 1 week history of intermittent left sternal chest pain that has been worse over the last 2 days, and intermittent lightheadedness/dizziness for the past few years.  He has a past medical history of coronary artery disease s/p PCI x 2 to LAD in 2005, and left complete rotator cuff tear requiring multiple surgical repairs, most recent 12/23/24.  He had a diagnostic heart catheterization in November 2021 that showed 2 patent stents to the LAD, estimated EF of 50-60%.  No indications of acute coronary syndrome at this time, troponins are negative and flat, EKG is without acute ischemic changes, and his chest pain is atypical, muscular/pleuritic in nature brought on with movement, and deep inspiration.  Review of his past vital signs in the outpatient setting show he is frequently bradycardic with rates predominantly in the low to mid 50s, consistent with the telemetry reviewed in the ER.  His metoprolol succinate was reduced to 12.5 mg in December but symptoms did not improve.  Last dose taken was this morning, will hold his beta-blocker.  He reports his lightheadedness is brought on mostly by positional changes, orthostatic vital signs ordered, as well as echocardiogram.  Will continue to follow this patient with you and monitor telemetry, and vital signs closely.       Peripheral IV 07/31/25 20 G Right Antecubital (Active)   Site Assessment Clean;Dry;Intact 07/31/25 1146   Dressing Type Transparent 07/31/25 1146   Line Status Flushed 07/31/25 1146   Dressing Status Clean;Dry;Occlusive 07/31/25 1146   Number of days: 0       Code Status:  Full Code    I spent 60 minutes in the professional and overall care of this patient.        Phoebe Betancourt PA-C         [1]   Family History  Problem Relation Name Age of Onset    Heart disease Father     [2]    Scheduled medications   Medication Dose Route Frequency    docusate sodium  100 mg oral BID    enoxaparin  40 mg subcutaneous Daily   [3]   PRN medications   Medication    bisacodyl    bisacodyl    ondansetron    Or    ondansetron    polyethylene glycol    promethazine    Or    promethazine   [4]   Continuous Medications   Medication Dose Last Rate

## 2025-07-31 NOTE — PROGRESS NOTES
Luis Griffith is a 64 y.o. male admitted for No Principal Problem currently listed. Pharmacy reviewed the patient's [id number] medications and allergies for accuracy.    Medications ADDED:  None  Medications CHANGED:  fluticasone (Flonase) 50 mcg/actuation nasal spray  Medications REMOVED:   Turmeric Oral  Super B complex+C Oral     The list below reflects the updated PTA list. Comments regarding how patient may be taking medications differently can be found in the Admit Orders Activity  Prior to Admission Medications   Prescriptions Last Dose Informant   ZINC ORAL 7/31/2025 Morning Self   Sig: Take 20 mg by mouth once daily.   albuterol 90 mcg/actuation inhaler PRN Self   Sig: Inhale 2 puffs every 6 hours if needed for shortness   of breath or wheezing.   aspirin (Adult Low Dose Aspirin) 81 mg EC tablet 7/30/2025 Bedtime Self   Sig: Take 1 tablet (81 mg) by mouth once daily.   atorvastatin (Lipitor) 80 mg tablet 7/30/2025 Bedtime Self   Sig: Take 0.5 tablet (40 mg) by mouth once daily.   cholecalciferol (Vitamin D-3) 5,000 Units tablet 7/31/2025 Morning Self   Sig: Take 1 tablet (5,000 Units) by mouth once daily.   co-enzyme Q-10 30 mg capsule 7/31/2025 Morning Self   Sig: Take 1 capsule (30 mg) by mouth once daily.   cyanocobalamin, vitamin B-12, (Vitamin B-12) 5,000 mcg tablet, sublingual 7/31/2025 Morning Self   Sig: Place 1 tablet under the tongue once daily.   fluticasone (Flonase) 50 mcg/actuation nasal spray PRN Self   Sig: Administer 2 sprays into each nostril if needed.   glucosamine sulfate 500 mg capsule 7/31/2025 Morning Self   Sig: Take 1 tablet by mouth once daily.   metoprolol succinate XL (Toprol-XL) 25 mg 24 hr tablet 7/31/2025 Morning Self   Sig: Take 0.5 tablet (12.5 mg) by mouth once daily.   Do not crush or chew.   omega 3-dha-epa-fish oil (Fish OiL) 1,000 mg (120 mg-180 mg) capsule 7/31/2025 Morning Self   Sig: Take 1 capsule (1,000 mg) by mouth once daily.   omeprazole (PriLOSEC) 20  mg DR capsule 7/31/2025 Morning Self   Sig: Take 1 capsule (20 mg) by mouth once daily in the   morning. Take before meals.   sildenafil (Viagra) 100 mg tablet PRN Self   Sig: Take 1 tablet (100 mg) by mouth if needed.   tamsulosin (Flomax) 0.4 mg 24 hr capsule 7/31/2025 Morning Self   Sig: Take 1 capsule (0.4 mg) by mouth 2 times a day.   valACYclovir (Valtrex) 1 gram tablet 7/31/2025 Morning Self   Sig: Take 0.5 tablets (500 mg) by mouth once daily.      Facility-Administered Medications: None        The list below reflects the updated allergy list. Please review each documented allergy for additional clarification and justification.  Allergies  Reviewed by Carloz Pan on 7/31/2025   No Known Allergies         Pharmacy has [facility] updated [facility], [address] [phone].    Sources used to complete the med history include:   Patient interview with visitor at bedside, good historian, dispense report, care everywhere, chart review history    Below are additional concerns with the patient's PTA list.  None    Carloz Pan CPhT  Please reach out via [employer] Secure Chat for questions

## 2025-07-31 NOTE — PROGRESS NOTES
07/31/25 1631   Discharge Planning   Living Arrangements Spouse/significant other   Support Systems Spouse/significant other   Type of Residence Private residence   Number of Stairs Within Residence 24   Do you have animals or pets at home? Yes   Type of Animals or Pets 1 dog   Who is requesting discharge planning? Provider   Home or Post Acute Services None   Expected Discharge Disposition Home   Does the patient need discharge transport arranged? No   Financial Resource Strain   How hard is it for you to pay for the very basics like food, housing, medical care, and heating? Not hard   Housing Stability   In the last 12 months, was there a time when you were not able to pay the mortgage or rent on time? N   In the past 12 months, how many times have you moved where you were living? 0   At any time in the past 12 months, were you homeless or living in a shelter (including now)? N   Transportation Needs   In the past 12 months, has lack of transportation kept you from medical appointments or from getting medications? no   In the past 12 months, has lack of transportation kept you from meetings, work, or from getting things needed for daily living? No   Patient Choice   Patient / Family choosing to utilize agency / facility established prior to hospitalization No   Stroke Family Assessment   Stroke Family Assessment Needed No   Intensity of Service   Intensity of Service 0-30 min

## 2025-07-31 NOTE — PROGRESS NOTES
Spiritual Care Visit  Spiritual Care Request    Reason for Visit:  Routine Visit: Introduction  Crisis Visit: ED     Request Received From:       Focus of Care:  Visited With: Patient         Refer to :          Spiritual Care Assessment    Spiritual Assessment:                      Care Provided:       Sense of Community and or Denominational Affiliation:  Anabaptism   Values/Beliefs  Spiritual Requests During Hospitalization: Luis was having patient care.     Addressed Needs/Concerns and/or Emily Through:          Outcome:        Advance Directives:         Spiritual Care Annotation    Annotation:  Luis was having patient care today.  Joseph Mann

## 2025-07-31 NOTE — H&P
History Of Present Illness  Luis Griffith is a 64 y.o. male with history of MI in the past, follows with Dr. Figueroa, left shoulder rotated left shoulder SP's left total shoulder surgery.  Patient complaints of pain in his left chest started 1 week ago progressively pain getting worse.  He said that yesterday he had intermittent sharp pain in his left chest.  He said that pain aggravated with change of position like standing and or sitting forward.  He denies any radiation to his neck, back or arm.  He admits that he has left shoulder pain, and he will follow up with his Ortho surgeon He denies any trauma or injury.  He said he is actively any trauma he denies any nausea vomiting or abdominal pain no constipation or diarrhea no recent illness.  No neurological deficit admitted that he has 2 episodes of dizziness 1 day.  No fall or headache.  Denies any blurred vision or double vision    Past Medical History  Medical History[1]    Surgical History  Surgical History[2]     Social History  He reports that he has never smoked. He has never been exposed to tobacco smoke. He has never used smokeless tobacco. He reports current alcohol use of about 2.0 standard drinks of alcohol per week. He reports that he does not use drugs.    Family History  Family History[3]     Allergies  Patient has no known allergies.    Review of Systems   Constitutional:  Negative for activity change, appetite change and fatigue.   HENT:  Negative for sore throat and voice change.    Eyes: Negative.    Respiratory:  Negative for cough, shortness of breath and wheezing.    Cardiovascular:  Positive for chest pain. Negative for palpitations and leg swelling.   Gastrointestinal:  Negative for abdominal distention, abdominal pain, constipation, diarrhea, nausea and vomiting.   Endocrine: Negative.    Genitourinary: Negative.  Negative for difficulty urinating.   Musculoskeletal: Negative.  Negative for back pain, gait problem, neck pain and neck  stiffness.   Skin: Negative.    Allergic/Immunologic: Negative.    Neurological:  Positive for dizziness. Negative for seizures, syncope, facial asymmetry, speech difficulty, weakness, light-headedness and numbness.   Hematological: Negative.    Psychiatric/Behavioral: Negative.          Physical Exam  Vitals and nursing note reviewed.   Constitutional:       Appearance: Normal appearance.   HENT:      Head: Normocephalic and atraumatic.      Nose: No congestion or rhinorrhea.     Eyes:      Extraocular Movements: Extraocular movements intact.      Pupils: Pupils are equal, round, and reactive to light.       Cardiovascular:      Rate and Rhythm: Bradycardia present.   Pulmonary:      Effort: Pulmonary effort is normal.   Abdominal:      General: There is no distension.      Palpations: Abdomen is soft.     Musculoskeletal:         General: Normal range of motion.      Cervical back: Normal range of motion and neck supple.      Right lower leg: No edema.      Left lower leg: No edema.     Skin:     General: Skin is warm.     Neurological:      General: No focal deficit present.      Mental Status: He is alert and oriented to person, place, and time.     Psychiatric:         Mood and Affect: Mood normal.          Last Recorded Vitals  Blood pressure 115/69, pulse 58, temperature 36.7 °C (98.1 °F), temperature source Temporal, resp. rate 12, height 1.829 m (6'), weight 83.9 kg (185 lb), SpO2 97%.    Medications Ordered Prior to Encounter[4]  Results for orders placed or performed during the hospital encounter of 07/31/25 (from the past 24 hours)   CBC and Auto Differential   Result Value Ref Range    WBC 5.3 4.4 - 11.3 x10*3/uL    nRBC 0.0 0.0 - 0.0 /100 WBCs    RBC 4.35 (L) 4.50 - 5.90 x10*6/uL    Hemoglobin 13.8 13.5 - 17.5 g/dL    Hematocrit 41.1 41.0 - 52.0 %    MCV 95 80 - 100 fL    MCH 31.7 26.0 - 34.0 pg    MCHC 33.6 32.0 - 36.0 g/dL    RDW 12.7 11.5 - 14.5 %    Platelets 194 150 - 450 x10*3/uL    Neutrophils %  52.5 40.0 - 80.0 %    Immature Granulocytes %, Automated 0.2 0.0 - 0.9 %    Lymphocytes % 29.0 13.0 - 44.0 %    Monocytes % 12.7 2.0 - 10.0 %    Eosinophils % 4.7 0.0 - 6.0 %    Basophils % 0.9 0.0 - 2.0 %    Neutrophils Absolute 2.76 1.20 - 7.70 x10*3/uL    Immature Granulocytes Absolute, Automated 0.01 0.00 - 0.70 x10*3/uL    Lymphocytes Absolute 1.53 1.20 - 4.80 x10*3/uL    Monocytes Absolute 0.67 0.10 - 1.00 x10*3/uL    Eosinophils Absolute 0.25 0.00 - 0.70 x10*3/uL    Basophils Absolute 0.05 0.00 - 0.10 x10*3/uL   Comprehensive metabolic panel   Result Value Ref Range    Glucose 107 (H) 74 - 99 mg/dL    Sodium 139 136 - 145 mmol/L    Potassium 3.9 3.5 - 5.3 mmol/L    Chloride 103 98 - 107 mmol/L    Bicarbonate 31 21 - 32 mmol/L    Anion Gap 9 (L) 10 - 20 mmol/L    Urea Nitrogen 19 6 - 23 mg/dL    Creatinine 0.90 0.50 - 1.30 mg/dL    eGFR >90 >60 mL/min/1.73m*2    Calcium 9.5 8.6 - 10.3 mg/dL    Albumin 4.5 3.4 - 5.0 g/dL    Alkaline Phosphatase 61 33 - 136 U/L    Total Protein 7.4 6.4 - 8.2 g/dL    AST 21 9 - 39 U/L    Bilirubin, Total 1.5 (H) 0.0 - 1.2 mg/dL    ALT 21 10 - 52 U/L   B-Type Natriuretic Peptide   Result Value Ref Range    BNP 47 0 - 99 pg/mL   Troponin I, High Sensitivity, Initial   Result Value Ref Range    Troponin I, High Sensitivity 3 0 - 20 ng/L   Troponin, High Sensitivity, 1 Hour   Result Value Ref Range    Troponin I, High Sensitivity <3 0 - 20 ng/L     XR chest 1 view  Result Date: 7/31/2025  STUDY: Chest Radiograph;  7/31/2025 12:16 INDICATION: Chest pain. COMPARISON: None Available ACCESSION NUMBER(S): [id number] ORDERING CLINICIAN: JUANITA IRMA TECHNIQUE:  Frontal chest was obtained at 12:15 hours. FINDINGS: CARDIOMEDIASTINAL SILHOUETTE: Cardiomediastinal silhouette is normal in size and configuration.  LUNGS: Lungs are clear.  ABDOMEN: No remarkable upper abdominal findings.  BONES: No acute osseous changes.    Normal heart size with no radiographic signs of active pulmonary  parenchymal infiltration. Signed by Khloe Carlson DO         Assessment & Plan  Coronary atherosclerosis  Complains of chest pain with history of coronary artery disease with history of MI  Chest pain  Troponin less than 3 normal  Bradycardia on telemetry  Consult cardiology  Admits to.  Essential hypertension  BP is under control  Continue with the same home meds  DVT prophylaxis  On Lovenox      Roz Laurie, APRN-CNP         [1]   Past Medical History:  Diagnosis Date    Angina pectoris 11/24/2023    Asthma     Atherosclerotic heart disease of native coronary artery with other forms of angina pectoris 11/24/2023    Coronary atherosclerosis 02/27/2007    Formatting of this note might be different from the original. MI  Date: 1/05 PCI  Date: 1/05 - stent to his LAD Last Assessment & Plan: Formatting of this note might be different from the original. Assessment: States is well controlled with medication and denies any recent exacerbations Follows with PCP and cardiology History of MI with single stent placed in 2005 Treated with ASA 81 mg Denies any    Essential hypertension 11/24/2023    GERD (gastroesophageal reflux disease)     Heart disease 11/24/2023    Hiatal hernia     Mixed hyperlipidemia 11/24/2023    Myocardial infarction (Multi)     Unspecified blepharitis right eye, unspecified eyelid 10/08/2019    Blepharitis of both eyes   [2]   Past Surgical History:  Procedure Laterality Date    APPENDECTOMY      CARDIAC CATHETERIZATION      CARDIAC SURGERY      CORONARY STENT PLACEMENT      HERNIA REPAIR      MR ATHROGRAM SHOULDER LEFT W FL GUIDED INJECTION Left 04/03/2015    MR SHOULDER ARTHROGRAM LEFT W FL GUIDED INJECTION [facility]    ROTATOR CUFF REPAIR Bilateral    [3]   Family History  Problem Relation Name Age of Onset    Heart disease Father     [4]   No current facility-administered medications on file prior to encounter.     Current Outpatient Medications on File Prior to Encounter   Medication  Sig Dispense Refill    aspirin (Adult Low Dose Aspirin) 81 mg EC tablet Take 1 tablet (81 mg) by mouth once daily.      atorvastatin (Lipitor) 80 mg tablet Take 1 tablet (80 mg) by mouth once daily. (Patient taking differently: Take 0.5 tablets (40 mg) by mouth once daily.) 90 tablet 3    cholecalciferol (Vitamin D-3) 5,000 Units tablet Take 1 tablet (5,000 Units) by mouth once daily.      co-enzyme Q-10 30 mg capsule Take 1 capsule (30 mg) by mouth once daily.      cyanocobalamin, vitamin B-12, (Vitamin B-12) 5,000 mcg tablet, sublingual Place 1 tablet under the tongue once daily.      glucosamine sulfate 500 mg capsule Take 1 tablet by mouth once daily.      metoprolol succinate XL (Toprol-XL) 25 mg 24 hr tablet Take 1 tablet (25 mg) by mouth once daily. Do not crush or chew. (Patient taking differently: Take 0.5 tablets (12.5 mg) by mouth once daily. Do not crush or chew.) 90 tablet 3    omega 3-dha-epa-fish oil (Fish OiL) 1,000 mg (120 mg-180 mg) capsule Take 1 capsule (1,000 mg) by mouth once daily.      omeprazole (PriLOSEC) 20 mg DR capsule Take 1 capsule (20 mg) by mouth once daily in the morning. Take before meals.      tamsulosin (Flomax) 0.4 mg 24 hr capsule Take 1 capsule (0.4 mg) by mouth 2 times a day.      valACYclovir (Valtrex) 1 gram tablet Take 0.5 tablets (500 mg) by mouth once daily.      ZINC ORAL Take 20 mg by mouth once daily.      [DISCONTINUED] albuterol 90 mcg/actuation inhaler Inhale 2 puffs every 6 hours if needed for shortness of breath or wheezing.      [DISCONTINUED] fluticasone (Flonase) 50 mcg/actuation nasal spray Administer 2 sprays into each nostril if needed.      [DISCONTINUED] sildenafil (Viagra) 100 mg tablet Take 1 tablet (100 mg) by mouth if needed.      [DISCONTINUED] TURMERIC ORAL Take 1 tablet by mouth once daily.      [DISCONTINUED] vitamin B complex vit C no.4 (SUPER B COMPLEX + C ORAL) Take 1 tablet by mouth once daily.

## 2025-07-31 NOTE — CARE PLAN
Pt does not have a POA or Living will  ADOD: 1 day    Pt lives at home with his wife in a 2 story home with a basement  Pt is retired, very active; drives, independent  No DME  He does not smoke, he drinks 1 beer almost daily  Wears readers, no hearing aids  His PCP is Dr. Saji Parra from [facility] and [facility] [place] on [address] [facility]  Denies depression and anxiety  No falls in the last 6 months  No anticipated discharge needs.    DISCHARGE PLAN: HOME WITH WIFE

## 2025-07-31 NOTE — NURSING NOTE
Assumed care.  Patient comfortably lying in bed, alert and oriented, no distress and denies pain.  Denies other needs at this time.  Bed at lowest position, 2/4 side rails raised.  Call light in reach.  Plan of care ongoing.

## 2025-07-31 NOTE — PROGRESS NOTES
07/31/25 1632   [facility] Disability Status   Are you deaf or do you have serious difficulty hearing? N   Are you blind or do you have serious difficulty seeing, even when wearing glasses? N   Because of a physical, mental, or emotional condition, do you have serious difficulty concentrating, remembering, or making decisions? (5 years old or older) N   Do you have serious difficulty walking or climbing stairs? N   Do you have serious difficulty dressing or bathing? N   Because of a physical, mental, or emotional condition, do you have serious difficulty doing errands alone such as visiting the doctor? N

## 2025-07-31 NOTE — SIGNIFICANT EVENT
Pt asked if you can call him to see if med would be same price or lower going thru [facility]> His # is [phone]

## 2025-07-31 NOTE — TELEPHONE ENCOUNTER
Patient called in complaining of a chest pain that feels muscle related and hurts when he breathes in, wanted to be seen in office ASAP with Dr. Figueroa. Per Dr. Figueroa- advised patient to go to the ER.

## 2025-08-01 ENCOUNTER — APPOINTMENT (OUTPATIENT)
Dept: CARDIOLOGY | Facility: HOSPITAL | Age: 65
End: 2025-08-01
Payer: COMMERCIAL

## 2025-08-01 VITALS
BODY MASS INDEX: 25.06 KG/M2 | TEMPERATURE: 97.9 F | HEIGHT: 72 IN | OXYGEN SATURATION: 99 % | DIASTOLIC BLOOD PRESSURE: 73 MMHG | WEIGHT: 185 LBS | SYSTOLIC BLOOD PRESSURE: 124 MMHG | HEART RATE: 73 BPM | RESPIRATION RATE: 18 BRPM

## 2025-08-01 PROBLEM — R07.9 CHEST PAIN: Status: RESOLVED | Noted: 2023-11-24 | Resolved: 2025-08-01

## 2025-08-01 LAB
ANION GAP SERPL CALCULATED.3IONS-SCNC: 8 MMOL/L (ref 10–20)
AORTIC VALVE MEAN GRADIENT: 3 MMHG
AORTIC VALVE PEAK VELOCITY: 1.37 M/S
AV PEAK GRADIENT: 7 MMHG
AVA (PEAK VEL): 3.17 CM2
AVA (VTI): 3.45 CM2
BODY SURFACE AREA: 2.06 M2
BUN SERPL-MCNC: 21 MG/DL (ref 6–23)
CALCIUM SERPL-MCNC: 9.2 MG/DL (ref 8.6–10.3)
CHLORIDE SERPL-SCNC: 106 MMOL/L (ref 98–107)
CO2 SERPL-SCNC: 28 MMOL/L (ref 21–32)
CREAT SERPL-MCNC: 0.93 MG/DL (ref 0.5–1.3)
EGFRCR SERPLBLD CKD-EPI 2021: >90 ML/MIN/1.73M*2
EJECTION FRACTION APICAL 4 CHAMBER: 76
EJECTION FRACTION: 58 %
ERYTHROCYTE [DISTWIDTH] IN BLOOD BY AUTOMATED COUNT: 12.6 % (ref 11.5–14.5)
GLUCOSE SERPL-MCNC: 96 MG/DL (ref 74–99)
HCT VFR BLD AUTO: 42.1 % (ref 41–52)
HGB BLD-MCNC: 14 G/DL (ref 13.5–17.5)
LEFT VENTRICLE INTERNAL DIMENSION DIASTOLE: 4.72 CM (ref 3.5–6)
LEFT VENTRICULAR OUTFLOW TRACT DIAMETER: 2 CM
LV EJECTION FRACTION BIPLANE: 69 %
MCH RBC QN AUTO: 31.6 PG (ref 26–34)
MCHC RBC AUTO-ENTMCNC: 33.3 G/DL (ref 32–36)
MCV RBC AUTO: 95 FL (ref 80–100)
MITRAL VALVE E/A RATIO: 0.91
NRBC BLD-RTO: 0 /100 WBCS (ref 0–0)
PLATELET # BLD AUTO: 198 X10*3/UL (ref 150–450)
POTASSIUM SERPL-SCNC: 4.2 MMOL/L (ref 3.5–5.3)
RBC # BLD AUTO: 4.43 X10*6/UL (ref 4.5–5.9)
RIGHT VENTRICLE PEAK SYSTOLIC PRESSURE: 32 MMHG
SODIUM SERPL-SCNC: 138 MMOL/L (ref 136–145)
WBC # BLD AUTO: 6 X10*3/UL (ref 4.4–11.3)

## 2025-08-01 PROCEDURE — 2500000002 HC RX 250 W HCPCS SELF ADMINISTERED DRUGS (ALT 637 FOR MEDICARE OP, ALT 636 FOR OP/ED): Performed by: NURSE PRACTITIONER

## 2025-08-01 PROCEDURE — 85027 COMPLETE CBC AUTOMATED: CPT | Performed by: NURSE PRACTITIONER

## 2025-08-01 PROCEDURE — 93306 TTE W/DOPPLER COMPLETE: CPT | Performed by: INTERNAL MEDICINE

## 2025-08-01 PROCEDURE — 99232 SBSQ HOSP IP/OBS MODERATE 35: CPT

## 2025-08-01 PROCEDURE — 99239 HOSP IP/OBS DSCHRG MGMT >30: CPT | Performed by: NURSE PRACTITIONER

## 2025-08-01 PROCEDURE — 2500000001 HC RX 250 WO HCPCS SELF ADMINISTERED DRUGS (ALT 637 FOR MEDICARE OP): Performed by: NURSE PRACTITIONER

## 2025-08-01 PROCEDURE — 2500000004 HC RX 250 GENERAL PHARMACY W/ HCPCS (ALT 636 FOR OP/ED): Performed by: NURSE PRACTITIONER

## 2025-08-01 PROCEDURE — 36415 COLL VENOUS BLD VENIPUNCTURE: CPT | Performed by: NURSE PRACTITIONER

## 2025-08-01 PROCEDURE — 93306 TTE W/DOPPLER COMPLETE: CPT

## 2025-08-01 PROCEDURE — G0378 HOSPITAL OBSERVATION PER HR: HCPCS

## 2025-08-01 PROCEDURE — 96372 THER/PROPH/DIAG INJ SC/IM: CPT | Performed by: NURSE PRACTITIONER

## 2025-08-01 PROCEDURE — 80048 BASIC METABOLIC PNL TOTAL CA: CPT | Performed by: NURSE PRACTITIONER

## 2025-08-01 RX ORDER — ASPIRIN 81 MG/1
81 TABLET ORAL DAILY
Status: DISCONTINUED | OUTPATIENT
Start: 2025-08-01 | End: 2025-08-01 | Stop reason: HOSPADM

## 2025-08-01 RX ORDER — METOPROLOL SUCCINATE 25 MG/1
12.5 TABLET, EXTENDED RELEASE ORAL DAILY
Start: 2025-08-01

## 2025-08-01 RX ORDER — METOPROLOL SUCCINATE 25 MG/1
12.5 TABLET, EXTENDED RELEASE ORAL DAILY
Status: DISCONTINUED | OUTPATIENT
Start: 2025-08-01 | End: 2025-08-01 | Stop reason: HOSPADM

## 2025-08-01 RX ORDER — ASPIRIN 81 MG/1
81 TABLET ORAL DAILY
Status: DISCONTINUED | OUTPATIENT
Start: 2025-08-02 | End: 2025-08-01

## 2025-08-01 RX ORDER — PETROLATUM,WHITE/LANOLIN
1 OINTMENT (GRAM) TOPICAL DAILY
Status: DISCONTINUED | OUTPATIENT
Start: 2025-08-01 | End: 2025-08-01

## 2025-08-01 RX ORDER — UBIDECARENONE 30 MG
30 CAPSULE ORAL DAILY
Status: DISCONTINUED | OUTPATIENT
Start: 2025-08-01 | End: 2025-08-01

## 2025-08-01 RX ORDER — PANTOPRAZOLE SODIUM 40 MG/1
40 TABLET, DELAYED RELEASE ORAL DAILY
Status: DISCONTINUED | OUTPATIENT
Start: 2025-08-02 | End: 2025-08-01 | Stop reason: HOSPADM

## 2025-08-01 RX ORDER — VALACYCLOVIR HYDROCHLORIDE 500 MG/1
500 TABLET, FILM COATED ORAL DAILY
Status: DISCONTINUED | OUTPATIENT
Start: 2025-08-01 | End: 2025-08-01 | Stop reason: HOSPADM

## 2025-08-01 RX ORDER — TAMSULOSIN HYDROCHLORIDE 0.4 MG/1
0.4 CAPSULE ORAL 2 TIMES DAILY
Status: DISCONTINUED | OUTPATIENT
Start: 2025-08-01 | End: 2025-08-01 | Stop reason: HOSPADM

## 2025-08-01 RX ORDER — CHOLECALCIFEROL (VITAMIN D3) 25 MCG
5000 TABLET,CHEWABLE ORAL DAILY
Status: DISCONTINUED | OUTPATIENT
Start: 2025-08-01 | End: 2025-08-01 | Stop reason: HOSPADM

## 2025-08-01 RX ORDER — ATORVASTATIN CALCIUM 40 MG/1
40 TABLET, FILM COATED ORAL NIGHTLY
Status: DISCONTINUED | OUTPATIENT
Start: 2025-08-01 | End: 2025-08-01 | Stop reason: HOSPADM

## 2025-08-01 RX ADMIN — VALACYCLOVIR HYDROCHLORIDE 500 MG: 500 TABLET, FILM COATED ORAL at 14:12

## 2025-08-01 RX ADMIN — TAMSULOSIN HYDROCHLORIDE 0.4 MG: 0.4 CAPSULE ORAL at 14:12

## 2025-08-01 RX ADMIN — ENOXAPARIN SODIUM 40 MG: 100 INJECTION SUBCUTANEOUS at 09:20

## 2025-08-01 RX ADMIN — ASPIRIN 81 MG: 81 TABLET, DELAYED RELEASE ORAL at 14:12

## 2025-08-01 ASSESSMENT — COGNITIVE AND FUNCTIONAL STATUS - GENERAL
DAILY ACTIVITIY SCORE: 24
MOBILITY SCORE: 24

## 2025-08-01 ASSESSMENT — PAIN SCALES - GENERAL: PAINLEVEL_OUTOF10: 0 - NO PAIN

## 2025-08-01 NOTE — DISCHARGE SUMMARY
Discharge Diagnosis  Chest pain     Coronary atherosclerosis     Essential hypertension       Issues Requiring Follow-Up  Follow-up with PCP within 1 week  Follow-up with cardiology.    Discharge Meds     Medication List      CONTINUE taking these medications     Adult Low Dose Aspirin 81 mg EC tablet; Generic drug: aspirin   atorvastatin 80 mg tablet; Commonly known as: Lipitor; Take 1 tablet (80   mg) by mouth once daily.   metoprolol succinate XL 25 mg 24 hr tablet; Commonly known as:   Toprol-XL; Take 0.5 tablets (12.5 mg) by mouth once daily. Do not crush or   chew.   omeprazole 20 mg DR capsule; Commonly known as: PriLOSEC   tamsulosin 0.4 mg 24 hr capsule; Commonly known as: Flomax   valACYclovir 1 gram tablet; Commonly known as: Valtrex   ZINC ORAL     STOP taking these medications     cholecalciferol 125 mcg (5,000 units) tablet; Commonly known as: Vitamin   D-3   co-enzyme Q-10 30 mg capsule   Fish OiL 1,000 (120-180) mg capsule; Generic drug: omega 3-dha-epa-fish   oil   glucosamine sulfate 500 mg capsule   Vitamin B-12 5,000 mcg tablet, sublingual; Generic drug: cyanocobalamin   (vitamin B-12)       Test Results Pending At Discharge  Pending Labs       No current pending labs.            Hospital Course  HPI from admission:  Luis Griffith is a 64 y.o. male with history of MI in the past, follows with Dr. Figueroa, left shoulder rotated left shoulder SP's left total shoulder surgery.  Patient complaints of pain in his left chest started 1 week ago progressively pain getting worse.  He said that yesterday he had intermittent sharp pain in his left chest.  He said that pain aggravated with change of position like standing and or sitting forward.  He denies any radiation to his neck, back or arm.  He admits that he has left shoulder pain, and he will follow up with his Ortho surgeon He denies any trauma or injury.  He said he is actively any trauma he denies any nausea vomiting or abdominal pain no  constipation or diarrhea no recent illness.  No neurological deficit admitted that he has 2 episodes of dizziness 1 day.  No fall or headache.  Denies any blurred vision or double vision     Brief Hospital course:  Patient was admitted with atypical chest pain, EKG does not show acute ischemic changes, troponin and BNP were negative.  Chest x-ray does not show acute cardio pulmonary process.  Patient's home high-dose of atorvastatin, aspirin diagnostic cath on 11/2021 patent stent to LAD placed stent in 2005.  Lipid panel were normal.  Patient was bradycardia beta-blocker was held.  Hypertension is under control without medication.  The echo was done on 8/1/2025 with EF 55 to 60%.  Evaluated by cardiology okay to be discharge, okay to be discharged and patient has to follow-up with his cardiology Dr. Figueroa at his scheduled appointment this month.  Patient is hemodynamically stable on room air no chest pain.  Patient will discharge home with no need    Pertinent Physical Exam At Time of Discharge  Physical Exam  General: alert, no diaphoresis   HENT: mucous membranes moist, external ears normal, no rhinorrhea   Eyes: no icterus or injection, no discharge   Lungs: CTA BL   Heart: RRR, no murmurs, no LE edema BL   GI: abdomen soft, nontender, nondistended, BS present   MSK: no joint effusion or deformity   Skin: no rashes, erythema, or ecchymosis   Neuro: grossly normal cognition, motor strength, sensation  Results for orders placed or performed during the hospital encounter of 07/31/25 (from the past 24 hours)   CBC   Result Value Ref Range    WBC 6.0 4.4 - 11.3 x10*3/uL    nRBC 0.0 0.0 - 0.0 /100 WBCs    RBC 4.43 (L) 4.50 - 5.90 x10*6/uL    Hemoglobin 14.0 13.5 - 17.5 g/dL    Hematocrit 42.1 41.0 - 52.0 %    MCV 95 80 - 100 fL    MCH 31.6 26.0 - 34.0 pg    MCHC 33.3 32.0 - 36.0 g/dL    RDW 12.6 11.5 - 14.5 %    Platelets 198 150 - 450 x10*3/uL   Basic metabolic panel   Result Value Ref Range    Glucose 96 74 - 99  mg/dL    Sodium 138 136 - 145 mmol/L    Potassium 4.2 3.5 - 5.3 mmol/L    Chloride 106 98 - 107 mmol/L    Bicarbonate 28 21 - 32 mmol/L    Anion Gap 8 (L) 10 - 20 mmol/L    Urea Nitrogen 21 6 - 23 mg/dL    Creatinine 0.93 0.50 - 1.30 mg/dL    eGFR >90 >60 mL/min/1.73m*2    Calcium 9.2 8.6 - 10.3 mg/dL   Transthoracic Echo Complete   Result Value Ref Range    AV pk angelica 1.37 m/s    LVOT diam 2.00 cm    AV mn grad 3 mmHg    MV E/A ratio 0.91     LV Biplane EF 69 %    LV EF 58 %    RVSP 32 mmHg    LVIDd 4.72 cm    AV pk grad 7 mmHg    Aortic Valve Area by Continuity of VTI 3.45 cm2    Aortic Valve Area by Continuity of Peak Velocity 3.17 cm2    LV A4C EF 76.0     BSA 2.06 m2     ECG 12 lead  Result Date: 8/1/2025  Normal sinus rhythm Anteroseptal infarct , age undetermined Abnormal ECG When compared with ECG of 10-DEC-2024 09:43, Anteroseptal infarct is now Present    Transthoracic Echo Complete  Result Date: 8/1/2025           [place] [address]            Phone [phone] TRANSTHORACIC ECHOCARDIOGRAM REPORT Patient Name:       ANTHONY Pal Physician:    [id number] Chriss Coronado DO Study Date:         8/1/2025             Ordering Provider:    [id number] CLIFF MEYER MRN/PID:            [id number]             Fellow: Accession#:         [id number]         Nurse: Date of Birth/Age:  1960 / 64      Sonographer:          Art mike RDCS Gender Assigned at  M                    Additional Staff: Birth: Height:             180.34 cm            Admit Date: Weight:             83.91 kg             Admission Status:     Inpatient -                                                                Routine BSA / BMI:          2.04 m2 / 25.80      Department Location:  [facility]  [facility]                     kg/m2 Blood Pressure: 124 /73 mmHg Study Type:    TRANSTHORACIC ECHO (TTE) COMPLETE Diagnosis/ICD: Other chest pain-R07.89 Indication:    Chest Pain CPT Codes:     Echo Complete w Full Doppler-[id number] Patient History: Pertinent History: CAD, Chest Pain, CHF, LE Edema, HTN, Hyperlipidemia and                    Murmur. GERD, PCI. Study Detail: The following Echo studies were performed: 2D, M-Mode, Doppler and               color flow.  PHYSICIAN INTERPRETATION: Left Ventricle: Left ventricular ejection fraction is normal by visual estimate at 55-60%. There are no regional wall motion abnormalities. The left ventricular cavity size is normal. There is normal septal and normal posterior left ventricular wall thickness. There is left ventricular concentric remodeling. Spectral Doppler shows a Grade I (impaired relaxation pattern) of left ventricular diastolic filling with normal left atrial filling pressure. Left Atrium: The left atrial size is normal. Right Ventricle: The right ventricle is normal in size. There is normal right ventricular global systolic function. Right Atrium: The right atrial size is normal. Aortic Valve: The aortic valve is trileaflet. The aortic valve area by VTI is 3.45 cmÂ² with a peak velocity of 1.37 m/s. The peak and mean gradients are 7 mmHg and 3 mmHg, respectively, with a dimensionless index of 1.10. There is no evidence of aortic valve regurgitation. Mitral Valve: The mitral valve is normal in structure. There is trace mitral valve regurgitation. The E Vmax is 0.69 m/s. Tricuspid Valve: The tricuspid valve is structurally normal. There is trace tricuspid regurgitation. The Doppler estimated right ventricular systolic pressure (RVSP) is slightly elevated at 32 mmHg. Pulmonic Valve: The pulmonic valve is not well visualized. The pulmonic valve regurgitation was not well visualized. Pericardium: No pericardial effusion noted. Aorta: The aortic root is normal.   CONCLUSIONS:  1. Left ventricular ejection fraction is normal by visual estimate at 55-60%.  2. Spectral Doppler shows a Grade I (impaired relaxation pattern) of left ventricular diastolic filling with normal left atrial filling pressure.  3. There is normal right ventricular global systolic function.  4. The Doppler estimated RVSP is slightly elevated at 32 mmHg. QUANTITATIVE DATA SUMMARY:  2D MEASUREMENTS:             Normal Ranges: Ao Root s:       4.10 cm IVSd:            0.78 cm     (0.6-1.1cm) LVPWd:           1.02 cm     (0.6-1.1cm) LVIDd:           4.72 cm     (3.9-5.9cm) LVIDs:           3.05 cm LV Mass Index:   70.7 g/m2 LVEDV Index:     75.10 ml/m2 LV % FS          35.5 %  LEFT ATRIUM:                 Normal Ranges: LA Vol A4C:       69.3 ml LA Vol A2C:       47.1 ml LA Vol Index BSA: 28.5 ml/m2  RIGHT ATRIUM:          Normal Ranges: RA Area A4C:  17.7 cm2  LV SYSTOLIC FUNCTION:                      Normal Ranges: EF-A4C View:    76 % (>=55%) EF-A2C View:    57 % EF-Biplane:     69 % EF-Visual:      58 % EF-Auto:        59 % LV EF Reported: 58 %  LV DIASTOLIC FUNCTION:           Normal Ranges: MV Peak E:             0.69 m/s  (0.7-1.2 m/s) MV Peak A:             0.76 m/s  (0.42-0.7 m/s) E/A Ratio:             0.91      (1.0-2.2) MV e'                  0.111 m/s (>8.0) MV lateral e'          0.12 m/s MV medial e'           0.11 m/s E/e' Ratio:            6.23      (<8.0)  MITRAL VALVE:          Normal Ranges: MV DT:        261 msec (150-240msec)  AORTIC VALVE:                     Normal Ranges: AoV Vmax:                1.37 m/s (<=1.7m/s) AoV Peak P.5 mmHg (<20mmHg) AoV Mean PG:             3.5 mmHg (1.7-11.5mmHg) LVOT Max Florencio:            1.38 m/s (<=1.1m/s) AoV VTI:                 30.37 cm (18-25cm) LVOT VTI:                33.27 cm LVOT Diameter:           2.00 cm  (1.8-2.4cm) AoV Area, VTI:           3.45 cm2 (2.5-5.5cm2) AoV Area,Vmax:           3.17 cm2 (2.5-4.5cm2) AoV Dimensionless  Index: 1.10  RIGHT VENTRICLE: RV Basal 3.60 cm RV Mid   2.90 cm RV Major 7.0 cm  TRICUSPID VALVE/RVSP:          Normal Ranges: Peak TR Velocity:     2.70 m/s Est. RA Pressure:     3 mmHg RV Syst Pressure:     32 mmHg  (< 30mmHg) IVC Diam:             1.95 cm  PULMONIC VALVE:          Normal Ranges: PV Max Florencio:     0.8 m/s  (0.6-0.9m/s) PV Max P.3 mmHg PV Mean P.2 mmHg PV VTI:         17.07 cm  AORTA: Asc Ao Diam 3.44 cm  [id number] Chriss Coronado DO Electronically signed on 2025 at 11:24:00 AM  ** Final **     XR chest 1 view  Result Date: 2025  STUDY: Chest Radiograph;  2025 12:16 INDICATION: Chest pain. COMPARISON: None Available ACCESSION NUMBER(S): [id number] ORDERING CLINICIAN: JUANITA SOLIS TECHNIQUE:  Frontal chest was obtained at 12:15 hours. FINDINGS: CARDIOMEDIASTINAL SILHOUETTE: Cardiomediastinal silhouette is normal in size and configuration.  LUNGS: Lungs are clear.  ABDOMEN: No remarkable upper abdominal findings.  BONES: No acute osseous changes.    Normal heart size with no radiographic signs of active pulmonary parenchymal infiltration. Signed by Khloe Carlson DO      Outpatient Follow-Up  No future appointments.    I spent 40 minutes in overall care    CLARISA Abarca-CNP

## 2025-08-01 NOTE — PROGRESS NOTES
08/01/25 1114   Discharge Planning   Expected Discharge Disposition Home     Patient has no therapy ordered at this time   Patient independent from home. Plans to discharge home with no needs. No needs from case management at this time     NO BARRIERS TO DISCHARGE FROM CARE TRANSITIONS

## 2025-08-01 NOTE — CARE PLAN
Over the shift, the patient did not make progress toward the following goals. Barriers to progression include . Recommendations to address these barriers include .      Problem: Pain - Adult  Goal: Verbalizes/displays adequate comfort level or baseline comfort level  8/1/2025 0615 by Laurent Cummings RN  Outcome: Progressing  7/31/2025 2236 by Laurent Cummings RN  Outcome: Progressing     Problem: Safety - Adult  Goal: Free from fall injury  8/1/2025 0615 by Laurent Cummings RN  Outcome: Progressing  7/31/2025 2236 by Laurent Cummings RN  Outcome: Progressing     Problem: Discharge Planning  Goal: Discharge to home or other facility with appropriate resources  8/1/2025 0615 by Laurent Cummings RN  Outcome: Progressing  7/31/2025 2236 by Laurent Cummings RN  Outcome: Progressing     Problem: Chronic Conditions and Co-morbidities  Goal: Patient's chronic conditions and co-morbidity symptoms are monitored and maintained or improved  8/1/2025 0615 by Laurent Cummings RN  Outcome: Progressing  7/31/2025 2236 by Laurent Cummings RN  Outcome: Progressing     Problem: Nutrition  Goal: Nutrient intake appropriate for maintaining nutritional needs  8/1/2025 0615 by Laurent Cummings RN  Outcome: Progressing  7/31/2025 2236 by Laurent Cummings RN  Outcome: Progressing

## 2025-08-01 NOTE — CARE PLAN
The clinical goals for the shift include Maintain normal wake/ sleep cycle.  Provide quiet environment.

## 2025-08-01 NOTE — CARE PLAN
Problem: Pain - Adult  Goal: Verbalizes/displays adequate comfort level or baseline comfort level  Outcome: Progressing     Problem: Safety - Adult  Goal: Free from fall injury  Outcome: Progressing     Problem: Discharge Planning  Goal: Discharge to home or other facility with appropriate resources  Outcome: Progressing     Problem: Chronic Conditions and Co-morbidities  Goal: Patient's chronic conditions and co-morbidity symptoms are monitored and maintained or improved  Outcome: Progressing     Problem: Nutrition  Goal: Nutrient intake appropriate for maintaining nutritional needs  Outcome: Progressing   The patient's goals for the shift include Rest.    The clinical goals for the shift include Maintain normal wake/ sleep cycle.  Provide quiet environment.

## 2025-08-01 NOTE — PROGRESS NOTES
Luis Griffith is a 64 y.o. male on day 0 of admission presenting with Chest pain.    Subjective   Patient is awake laying up in the bed.  States that he does have sharp left-sided chest pain with movement, he states that this pain only occurs with movement of his left arm and shoulder.  He denies pressure, palpitations, or tightness of the chest.  Patient denies shortness of breath, on room air breathing comfortably.        Objective     Physical Exam  Vitals and nursing note reviewed.   Constitutional:       General: He is not in acute distress.     Appearance: He is not ill-appearing.   HENT:      Head: Normocephalic and atraumatic.      Nose: Nose normal.      Mouth/Throat:      Mouth: Mucous membranes are moist.      Pharynx: Oropharynx is clear.     Eyes:      Extraocular Movements: Extraocular movements intact.       Cardiovascular:      Rate and Rhythm: Regular rhythm. Bradycardia present.      Heart sounds: Normal heart sounds. No murmur heard.     No friction rub. No gallop.      Comments: Sinus bradycardia with heart rates in the 50s  Pulmonary:      Effort: Pulmonary effort is normal. No respiratory distress.      Breath sounds: Normal breath sounds. No stridor. No wheezing, rhonchi or rales.   Chest:      Chest wall: No tenderness.   Abdominal:      Palpations: Abdomen is soft.     Musculoskeletal:         General: No swelling. Normal range of motion.      Cervical back: Normal range of motion and neck supple.      Right lower leg: No edema.      Left lower leg: No edema.      Comments: Baseline left shoulder discomfort     Skin:     General: Skin is warm and dry.      Capillary Refill: Capillary refill takes less than 2 seconds.     Neurological:      Mental Status: He is alert and oriented to person, place, and time.     Psychiatric:         Mood and Affect: Mood normal.         Behavior: Behavior normal.         Thought Content: Thought content normal.         Judgment: Judgment normal.         Last  Recorded Vitals  Blood pressure 124/73, pulse 73, temperature 36.6 °C (97.9 °F), temperature source Oral, resp. rate 18, height 1.829 m (6'), weight 83.9 kg (185 lb), SpO2 99%.  Intake/Output last 3 Shifts:  I/O last 3 completed shifts:  In: 240 (2.9 mL/kg) [P.O.:240]  Out: - (0 mL/kg)   Weight: 83.9 kg     Relevant Results  Results for orders placed or performed during the hospital encounter of 07/31/25 (from the past 24 hours)   CBC and Auto Differential   Result Value Ref Range    WBC 5.3 4.4 - 11.3 x10*3/uL    nRBC 0.0 0.0 - 0.0 /100 WBCs    RBC 4.35 (L) 4.50 - 5.90 x10*6/uL    Hemoglobin 13.8 13.5 - 17.5 g/dL    Hematocrit 41.1 41.0 - 52.0 %    MCV 95 80 - 100 fL    MCH 31.7 26.0 - 34.0 pg    MCHC 33.6 32.0 - 36.0 g/dL    RDW 12.7 11.5 - 14.5 %    Platelets 194 150 - 450 x10*3/uL    Neutrophils % 52.5 40.0 - 80.0 %    Immature Granulocytes %, Automated 0.2 0.0 - 0.9 %    Lymphocytes % 29.0 13.0 - 44.0 %    Monocytes % 12.7 2.0 - 10.0 %    Eosinophils % 4.7 0.0 - 6.0 %    Basophils % 0.9 0.0 - 2.0 %    Neutrophils Absolute 2.76 1.20 - 7.70 x10*3/uL    Immature Granulocytes Absolute, Automated 0.01 0.00 - 0.70 x10*3/uL    Lymphocytes Absolute 1.53 1.20 - 4.80 x10*3/uL    Monocytes Absolute 0.67 0.10 - 1.00 x10*3/uL    Eosinophils Absolute 0.25 0.00 - 0.70 x10*3/uL    Basophils Absolute 0.05 0.00 - 0.10 x10*3/uL   Comprehensive metabolic panel   Result Value Ref Range    Glucose 107 (H) 74 - 99 mg/dL    Sodium 139 136 - 145 mmol/L    Potassium 3.9 3.5 - 5.3 mmol/L    Chloride 103 98 - 107 mmol/L    Bicarbonate 31 21 - 32 mmol/L    Anion Gap 9 (L) 10 - 20 mmol/L    Urea Nitrogen 19 6 - 23 mg/dL    Creatinine 0.90 0.50 - 1.30 mg/dL    eGFR >90 >60 mL/min/1.73m*2    Calcium 9.5 8.6 - 10.3 mg/dL    Albumin 4.5 3.4 - 5.0 g/dL    Alkaline Phosphatase 61 33 - 136 U/L    Total Protein 7.4 6.4 - 8.2 g/dL    AST 21 9 - 39 U/L    Bilirubin, Total 1.5 (H) 0.0 - 1.2 mg/dL    ALT 21 10 - 52 U/L   B-Type Natriuretic Peptide    Result Value Ref Range    BNP 47 0 - 99 pg/mL   Troponin I, High Sensitivity, Initial   Result Value Ref Range    Troponin I, High Sensitivity 3 0 - 20 ng/L   Troponin, High Sensitivity, 1 Hour   Result Value Ref Range    Troponin I, High Sensitivity <3 0 - 20 ng/L   CBC   Result Value Ref Range    WBC 6.0 4.4 - 11.3 x10*3/uL    nRBC 0.0 0.0 - 0.0 /100 WBCs    RBC 4.43 (L) 4.50 - 5.90 x10*6/uL    Hemoglobin 14.0 13.5 - 17.5 g/dL    Hematocrit 42.1 41.0 - 52.0 %    MCV 95 80 - 100 fL    MCH 31.6 26.0 - 34.0 pg    MCHC 33.3 32.0 - 36.0 g/dL    RDW 12.6 11.5 - 14.5 %    Platelets 198 150 - 450 x10*3/uL   Basic metabolic panel   Result Value Ref Range    Glucose 96 74 - 99 mg/dL    Sodium 138 136 - 145 mmol/L    Potassium 4.2 3.5 - 5.3 mmol/L    Chloride 106 98 - 107 mmol/L    Bicarbonate 28 21 - 32 mmol/L    Anion Gap 8 (L) 10 - 20 mmol/L    Urea Nitrogen 21 6 - 23 mg/dL    Creatinine 0.93 0.50 - 1.30 mg/dL    eGFR >90 >60 mL/min/1.73m*2    Calcium 9.2 8.6 - 10.3 mg/dL      Assessment & Plan  Chest pain    Coronary atherosclerosis    Essential hypertension    Atypical chest pain-reviewed-EKG no acute ischemic changes, troponin and BNP negative, chest x-ray no acute cardiopulmonary process.  Telemetry currently showing sinus bradycardia w rates as low as 48, predominantly in the 50s  Coronary artery disease-continue high dose atorvastatin, aspirin, diagnostic cath 11/2021 patent stents to LAD, placed in 2005, lipid panel reviewed within normal limits  Bradycardia-hold BB, review of outpatient visits show patient is bradycardic at baseline in low 50's  Hypertension-currently controlled without medication, 115/69  Intermittent dizziness/lightheadedness-orthostatic blood pressures ordered, continue to monitor telemetry and vital signs, echocardiogram     Assessment and plan:     7/31: As above.  Patient is a 64 y.o. male presenting with 1 week history of intermittent left sternal chest pain that has been worse over the  last 2 days, and intermittent lightheadedness/dizziness for the past few years.  He has a past medical history of coronary artery disease s/p PCI x 2 to LAD in 2005, and left complete rotator cuff tear requiring multiple surgical repairs, most recent 12/23/24.  He had a diagnostic heart catheterization in November 2021 that showed 2 patent stents to the LAD, estimated EF of 50-60%.  No indications of acute coronary syndrome at this time, troponins are negative and flat, EKG is without acute ischemic changes, and his chest pain is atypical, muscular/pleuritic in nature brought on with movement, and deep inspiration.  Review of his past vital signs in the outpatient setting show he is frequently bradycardic with rates predominantly in the low to mid 50s, consistent with the telemetry reviewed in the ER.  His metoprolol succinate was reduced to 12.5 mg in December but symptoms did not improve.  Last dose taken was this morning, will hold his beta-blocker.  He reports his lightheadedness is brought on mostly by positional changes, orthostatic vital signs ordered, as well as echocardiogram.  Will continue to follow this patient with you and monitor telemetry, and vital signs closely.     8/1: Patient is awake laying up in the bed.  States that he does have sharp left-sided chest pain with movement, he states that this pain only occurs with movement of his left arm and shoulder.  He denies pressure, palpitations, or tightness of the chest.  Patient denies shortness of breath, on room air breathing comfortably.   Most recent charted vitals show a heart rate of 73, blood pressure 124/73, satting 99% on room air.  On telemetry patient is sinus bradycardic with heart rates in the 50s.  Overnight patient's heart rate was still bradycardic with heart rate as low as 40. Orthostatic vital signs were completed yesterday afternoon and were negative; laying-heart rate 42 blood pressure 121/76, sitting-heart rate 47 blood pressure  128/75, standing-heart rate 48 blood pressure 117/70.  Labs this morning show a sodium of 138, potassium 4.2, BUN 21, creatinine 0.93, hemoglobin/hematocrit 14.0/42.1, platelets 198.  Will continue to hold beta-blocker at this time with the patient's bradycardia. Patient is scheduled to complete his echocardiogram this morning. Will likely sign off after echo is read.  Patient's chest pain is muscularskeletal in nature as he states that this pain only occurs with movement of his left arm and shoulder.     Addendum at 11:44, echocardiogram was completed this morning that showed an EF of 55-60%, a Grade I (impaired relaxation pattern) of left ventricular diastolic filling with normal left atrial filling pressure, normal right ventricular global systolic function, RVSP is slightly elevated at 32 mmHg. Patient should follow up with his cardiologist Dr. Figueroa at his scheduled appointment this month. Cardiology will sign off at this time as we do not have any further recommendations.    I spent 35 minutes in the professional and overall care of this patient.      Lisa Ty, APRN-CNP

## 2025-08-02 LAB
ATRIAL RATE: 65 BPM
P AXIS: 69 DEGREES
P OFFSET: 173 MS
P ONSET: 122 MS
PR INTERVAL: 204 MS
Q ONSET: 224 MS
QRS COUNT: 11 BEATS
QRS DURATION: 72 MS
QT INTERVAL: 374 MS
QTC CALCULATION(BAZETT): 388 MS
QTC FREDERICIA: 383 MS
R AXIS: 82 DEGREES
T AXIS: 52 DEGREES
T OFFSET: 411 MS
VENTRICULAR RATE: 65 BPM

## 2025-09-03 ENCOUNTER — APPOINTMENT (OUTPATIENT)
Age: 65
End: 2025-09-03
Payer: COMMERCIAL

## 2025-09-03 PROBLEM — Z01.810 ENCOUNTER FOR PRE-OPERATIVE CARDIOVASCULAR CLEARANCE: Status: ACTIVE | Noted: 2025-09-03

## 2025-09-03 ASSESSMENT — LIFESTYLE VARIABLES
HOW OFTEN DO YOU HAVE A DRINK CONTAINING ALCOHOL: MONTHLY OR LESS
HOW OFTEN DO YOU HAVE SIX OR MORE DRINKS ON ONE OCCASION: NEVER
HAS A RELATIVE, FRIEND, DOCTOR, OR ANOTHER HEALTH PROFESSIONAL EXPRESSED CONCERN ABOUT YOUR DRINKING OR SUGGESTED YOU CUT DOWN: NO
HOW MANY STANDARD DRINKS CONTAINING ALCOHOL DO YOU HAVE ON A TYPICAL DAY: 1 OR 2
AUDIT TOTAL SCORE: 1
HAVE YOU OR SOMEONE ELSE BEEN INJURED AS A RESULT OF YOUR DRINKING: NO
SKIP TO QUESTIONS 9-10: 1
AUDIT-C TOTAL SCORE: 1

## 2025-09-03 ASSESSMENT — ENCOUNTER SYMPTOMS
LOSS OF SENSATION IN FEET: 0
OCCASIONAL FEELINGS OF UNSTEADINESS: 0
DEPRESSION: 0

## 2025-09-03 ASSESSMENT — PAIN SCALES - GENERAL: PAINLEVEL_OUTOF10: 0-NO PAIN

## 2026-03-04 ENCOUNTER — APPOINTMENT (OUTPATIENT)
Age: 66
End: 2026-03-04
Payer: COMMERCIAL

## (undated) DEVICE — GOWN, SURGICAL, SIRUS, NON REINFORCED, LARGE

## (undated) DEVICE — CANNULA, THR 6.5 X 73MM CLEAR FLEXIBLE

## (undated) DEVICE — Device

## (undated) DEVICE — PROBE, APOLLO RF, 90 DEG, EXTRA LARTGE

## (undated) DEVICE — BLANKET, LOWER BODY, VHA PLUS, ADULT

## (undated) DEVICE — TUBING, PATIENT 8FT STERILE

## (undated) DEVICE — SUTURE, PROLENE, 3-0, 18 IN, PS2, BLUE

## (undated) DEVICE — DRESSING, ABDOMINAL, WET PRUF, TENDERSORB, 5 X 9 IN, STERILE

## (undated) DEVICE — DRESSING, TRANSPARENT, TEGADERM, FRAME STYLE, 4 X 4.5, STRL

## (undated) DEVICE — TUBING, SUCTION, 6MM X 10, CLEAN N-COND

## (undated) DEVICE — DRESSING, NON-ADHERENT, 3 X 3 IN, STERILE

## (undated) DEVICE — NEEDLE, SPECTRUM AUTOPASS, DISP

## (undated) DEVICE — SUTURE, MONOCRYL, 3-0, 27 IN, PS-2, UNDYED

## (undated) DEVICE — STRIP, SKIN CLOSURE, STERI STRIP, REINFORCED, 0.5 X 4 IN

## (undated) DEVICE — SUTURE, FIBERWIRE 2, T-5 TAPER NEEDLE, 38"

## (undated) DEVICE — SUTURE, VICRYL, 2-0, 27 IN, SH, UNDYED

## (undated) DEVICE — GLOVE, SURGICAL, SYNTHETIC, DERMAPRENE, 8, PF, LF, GREEN